# Patient Record
Sex: FEMALE | Race: WHITE | NOT HISPANIC OR LATINO | Employment: OTHER
[De-identification: names, ages, dates, MRNs, and addresses within clinical notes are randomized per-mention and may not be internally consistent; named-entity substitution may affect disease eponyms.]

---

## 2017-02-22 ENCOUNTER — CHARTING TRANS (OUTPATIENT)
Dept: OTHER | Age: 72
End: 2017-02-22

## 2017-04-25 ENCOUNTER — LAB SERVICES (OUTPATIENT)
Dept: OTHER | Age: 72
End: 2017-04-25

## 2017-04-25 LAB
ALBUMIN: 3.8 GM/DL (ref 3.5–5)
ALKALINE PHOSPHATASE: 84 U/L (ref 38–126)
ALT: 25 U/L (ref 9–72)
ANION GAP: 11 MEQ/L (ref 8–16)
AST/SGOT: 22 U/L (ref 14–53)
BASO #: 0.03 K/UL (ref 0–0.2)
BASO %: 0 % (ref 0–2)
BILIRUBIN TOTAL: 0.7 MG/DL (ref 0.2–1.3)
BUN: 23 MG/DL (ref 7–17)
CALCIUM: 9.1 MG/DL (ref 8.4–10.2)
CARBON DIOXIDE: 22 MMOL/L (ref 22–30)
CHLORIDE: 103 MMOL/L (ref 98–107)
CREATININE: 1.28 MG/DL (ref 0.52–1.04)
DIFFERENTIAL COMMENT: NORMAL
EGFR FOR AFRICAN AMERICANS: 49
EGFR FOR NON-AFRICAN AMERICANS: 41
EOS #: 0.12 K/UL (ref 0–0.5)
EOS %: 1 % (ref 0–4)
GLUCOSE: 328 MG/DL (ref 70–100)
HEMATOCRIT: 40.6 % (ref 37–47)
HEMOGLOBIN: 12.9 GM/DL (ref 12–16)
LD: 518 U/L (ref 313–618)
LYMPH #: 10.07 K/UL (ref 1–4.8)
LYMPH %: 67 % (ref 22–44)
MEAN CORPUSCULAR HEMOGLOBIN: 27.6 PG/CELL (ref 27–35)
MEAN CORPUSCULAR HGB CONC: 31.8 G/DL (ref 32–36)
MEAN CORPUSCULAR VOLUME: 86.8 FL (ref 81–103.2)
MEAN PLATELET VOLUME: 13.1 FL (ref 7.4–12)
MONO #: 0.64 K/UL (ref 0–0.8)
MONO %: 4 % (ref 2–10)
NEUTROPHILS #: 4.25 K/UL (ref 1.8–7.7)
NEUTROPHILS %: 28 % (ref 40–70)
PLATELET COUNT: 179 K/UL (ref 145–400)
PLATELET ESTIMATE: ADEQUATE
POTASSIUM: 4.7 MMOL/L (ref 3.6–5)
RBC MORPHOLOGY: NORMAL
RED CELL COUNT: 4.68 M/UL (ref 3.8–5.1)
RED CELL DISTRIBUTION WIDTH: 14.8 % (ref 11.5–14.5)
SODIUM: 136 MMOL/L (ref 137–145)
TOTAL PROTEIN: 6.2 GM/DL (ref 6.3–8.3)
WHITE BLOOD COUNT: 15.1 K/UL (ref 4.8–11.1)

## 2017-06-01 ENCOUNTER — CHARTING TRANS (OUTPATIENT)
Dept: OTHER | Age: 72
End: 2017-06-01

## 2017-06-05 ENCOUNTER — LAB SERVICES (OUTPATIENT)
Dept: OTHER | Age: 72
End: 2017-06-05

## 2017-06-05 LAB
ALBUMIN: 3.6 GM/DL (ref 3.5–5)
ALKALINE PHOSPHATASE: 75 U/L (ref 38–126)
ALT: 32 U/L (ref 9–72)
ANION GAP: 13 MEQ/L (ref 8–16)
AST/SGOT: 23 U/L (ref 14–53)
BASO #: 0.02 K/UL (ref 0–0.2)
BASO %: 0 % (ref 0–2)
BILIRUBIN TOTAL: 0.4 MG/DL (ref 0.2–1.3)
BUN: 33 MG/DL (ref 7–17)
CALCIUM: 8.9 MG/DL (ref 8.4–10.2)
CARBON DIOXIDE: 22 MMOL/L (ref 22–30)
CHLORIDE: 101 MMOL/L (ref 98–107)
CREATININE: 1.37 MG/DL (ref 0.52–1.04)
DIFFERENTIAL COMMENT: NORMAL
EGFR FOR AFRICAN AMERICANS: 46
EGFR FOR NON-AFRICAN AMERICANS: 38
EOS #: 0.03 K/UL (ref 0–0.5)
EOS %: 0 % (ref 0–4)
GLUCOSE: 352 MG/DL (ref 70–100)
HEMATOCRIT: 38.6 % (ref 37–47)
HEMOGLOBIN: 12.4 GM/DL (ref 12–16)
LD: 437 U/L (ref 313–618)
LYMPH #: 7.41 K/UL (ref 1–4.8)
LYMPH %: 62 % (ref 22–44)
MEAN CORPUSCULAR HEMOGLOBIN: 27.8 PG/CELL (ref 27–35)
MEAN CORPUSCULAR HGB CONC: 32.1 G/DL (ref 32–36)
MEAN CORPUSCULAR VOLUME: 86.5 FL (ref 81–103.2)
MEAN PLATELET VOLUME: 13.8 FL (ref 7.4–12)
MONO #: 0.41 K/UL (ref 0–0.8)
MONO %: 3 % (ref 2–10)
NEUTROPHILS #: 4.09 K/UL (ref 1.8–7.7)
NEUTROPHILS %: 34 % (ref 40–70)
PLATELET COUNT: 152 K/UL (ref 145–400)
POTASSIUM: 4.6 MMOL/L (ref 3.6–5)
RED CELL COUNT: 4.46 M/UL (ref 3.8–5.1)
RED CELL DISTRIBUTION WIDTH: 14.6 % (ref 11.5–14.5)
SODIUM: 136 MMOL/L (ref 137–145)
TOTAL PROTEIN: 6 GM/DL (ref 6.3–8.3)
WHITE BLOOD COUNT: 12 K/UL (ref 4.8–11.1)

## 2017-07-03 ENCOUNTER — LAB SERVICES (OUTPATIENT)
Dept: OTHER | Age: 72
End: 2017-07-03

## 2017-07-03 LAB
ALBUMIN: 3.9 GM/DL (ref 3.5–5)
ALKALINE PHOSPHATASE: 91 U/L (ref 38–126)
ALT: 38 U/L (ref 9–72)
ANION GAP: 11 MEQ/L (ref 8–16)
AST/SGOT: 21 U/L (ref 14–53)
BASO #: 0.02 K/UL (ref 0–0.2)
BASO %: 0 % (ref 0–2)
BILIRUBIN TOTAL: 0.5 MG/DL (ref 0.2–1.3)
BUN: 31 MG/DL (ref 7–17)
CALCIUM: 9.4 MG/DL (ref 8.4–10.2)
CARBON DIOXIDE: 27 MMOL/L (ref 22–30)
CHLORIDE: 101 MMOL/L (ref 98–107)
CREATININE: 1.42 MG/DL (ref 0.52–1.04)
DIFFERENTIAL COMMENT: NORMAL
EGFR FOR AFRICAN AMERICANS: 44
EGFR FOR NON-AFRICAN AMERICANS: 36
EOS #: 0.1 K/UL (ref 0–0.5)
EOS %: 1 % (ref 0–4)
GLUCOSE: 397 MG/DL (ref 70–100)
HEMATOCRIT: 42.9 % (ref 37–47)
HEMOGLOBIN: 13.7 GM/DL (ref 12–16)
LD: 424 U/L (ref 313–618)
LYMPH #: 8.37 K/UL (ref 1–4.8)
LYMPH %: 58 % (ref 22–44)
MEAN CORPUSCULAR HEMOGLOBIN: 28.1 PG/CELL (ref 27–35)
MEAN CORPUSCULAR HGB CONC: 31.9 G/DL (ref 32–36)
MEAN CORPUSCULAR VOLUME: 87.9 FL (ref 81–103.2)
MEAN PLATELET VOLUME: 13.5 FL (ref 7.4–12)
MONO #: 0.58 K/UL (ref 0–0.8)
MONO %: 4 % (ref 2–10)
NEUTROPHILS #: 5.31 K/UL (ref 1.8–7.7)
NEUTROPHILS %: 37 % (ref 40–70)
PLATELET COUNT: 170 K/UL (ref 145–400)
POTASSIUM: 4.8 MMOL/L (ref 3.6–5)
RED CELL COUNT: 4.88 M/UL (ref 3.8–5.1)
RED CELL DISTRIBUTION WIDTH: 14.8 % (ref 11.5–14.5)
SODIUM: 139 MMOL/L (ref 137–145)
TOTAL PROTEIN: 6.5 GM/DL (ref 6.3–8.3)
WHITE BLOOD COUNT: 14.4 K/UL (ref 4.8–11.1)

## 2017-07-20 ENCOUNTER — LAB SERVICES (OUTPATIENT)
Dept: OTHER | Age: 72
End: 2017-07-20

## 2017-07-20 LAB
ALBUMIN: 3.7 GM/DL (ref 3.5–5)
ANION GAP: 12 MEQ/L (ref 8–16)
APPEARANCE, URINE: ABNORMAL
BACTERIA, URINE: ABNORMAL
BASO #: 0.02 K/UL (ref 0–0.2)
BASO %: 0 % (ref 0–2)
BILIRUBIN-URINE: ABNORMAL
BUN: 29 MG/DL (ref 7–17)
CALCIUM: 8.4 MG/DL (ref 8.4–10.2)
CARBON DIOXIDE: 24 MMOL/L (ref 22–30)
CHLORIDE: 99 MMOL/L (ref 98–107)
CHOL/HDL RATIO (EUR): 2.8 RATIO (ref 3.7–5.6)
CHOLESTEROL: 108 MG/DL
COLOR, URINE: ABNORMAL
CREATININE RANDOM URINE: 106.4 MG/DL
CREATININE: 1.43 MG/DL (ref 0.52–1.04)
EGFR FOR AFRICAN AMERICANS: 44
EGFR FOR NON-AFRICAN AMERICANS: 36
EOS #: 0.01 K/UL (ref 0–0.5)
EOS %: 0 % (ref 0–4)
GLUCOSE URINE-UA: ABNORMAL MG/DL
GLUCOSE: 348 MG/DL (ref 70–100)
HDL CHOLESTEROL: 39 MG/DL
HEMATOCRIT: 36.7 % (ref 37–47)
HEMOGLOBIN A1C (BHA1C): 10.7 %NGSP
HEMOGLOBIN: 11.9 GM/DL (ref 12–16)
KETONE-URINE: ABNORMAL
LDL CHOLESTEROL (CALCULATED): 34 MG/DL
LYMPH #: 6.09 K/UL (ref 1–4.8)
LYMPH %: 52 % (ref 22–44)
MEAN CORPUSCULAR HEMOGLOBIN: 28.4 PG/CELL (ref 27–35)
MEAN CORPUSCULAR HGB CONC: 32.4 G/DL (ref 32–36)
MEAN CORPUSCULAR VOLUME: 87.6 FL (ref 81–103.2)
MEAN PLATELET VOLUME: 13.6 FL (ref 7.4–12)
MONO #: 0.76 K/UL (ref 0–0.8)
MONO %: 6 % (ref 2–10)
NEUTROPHILS #: 4.92 K/UL (ref 1.8–7.7)
NEUTROPHILS %: 42 % (ref 40–70)
NITRITE-URINE: ABNORMAL
NO CASTS, URINE: ABNORMAL
NO CRYSTALS, URINE: ABNORMAL
OCCULT BLOOD URINE: ABNORMAL
PH-URINE: 5.5
PHOSPHORUS: 2.9 MG/DL (ref 2.5–4.5)
PLATELET COUNT: 147 K/UL (ref 145–400)
POTASSIUM: 4.1 MMOL/L (ref 3.6–5)
PROTEIN-URINE-DIP: ABNORMAL
PROTEIN/CREA RATIO: 0.72 MG/MG
PTH-PARATHYROID HORMONE INTACT: 63.2 PG/ML (ref 14–72.2)
RBC-URINE: ABNORMAL /HPF (ref 0–2)
RED CELL COUNT: 4.19 M/UL (ref 3.8–5.1)
RED CELL DISTRIBUTION WIDTH: 14.2 % (ref 11.5–14.5)
SODIUM: 135 MMOL/L (ref 137–145)
SPECIFIC GRAVITY-URINE: 1.01 (ref 1–1.03)
SQUAMOUS EPITHELIAL CELL URINE: ABNORMAL /LPF
TOTAL PROTEIN RANDOM URINE: 77 MG/DL
TRIGLYCERIDES: 175 MG/DL
URINE LEUKOCYTE ESTERASE: ABNORMAL
UROBILINOGEN-URINE: 1 MG/DL
VLDL (CALC) (EUR): 35 MG/DL (ref 10–55)
WBC-URINE: ABNORMAL /HPF (ref 0–2)
WHITE BLOOD COUNT: 11.8 K/UL (ref 4.8–11.1)

## 2017-08-25 ENCOUNTER — LAB SERVICES (OUTPATIENT)
Dept: OTHER | Age: 72
End: 2017-08-25

## 2017-08-25 LAB
ALBUMIN: 3.6 GM/DL (ref 3.5–5)
ALKALINE PHOSPHATASE: 330 U/L (ref 38–126)
ALT: 342 U/L (ref 9–72)
ANION GAP: 12 MEQ/L (ref 8–16)
AST/SGOT: 129 U/L (ref 14–53)
BASO #: 0.03 K/UL (ref 0–0.2)
BASO %: 0 % (ref 0–2)
BILIRUBIN TOTAL: 1.8 MG/DL (ref 0.2–1.3)
BUN: 26 MG/DL (ref 7–17)
CALCIUM: 8.9 MG/DL (ref 8.4–10.2)
CARBON DIOXIDE: 24 MMOL/L (ref 22–30)
CHLORIDE: 103 MMOL/L (ref 98–107)
CREATININE: 1.41 MG/DL (ref 0.52–1.04)
EGFR FOR AFRICAN AMERICANS: 44
EGFR FOR NON-AFRICAN AMERICANS: 37
EOS #: 0.07 K/UL (ref 0–0.5)
EOS %: 0 % (ref 0–4)
GLUCOSE: 345 MG/DL (ref 70–100)
HEMATOCRIT: 40.6 % (ref 37–47)
HEMOGLOBIN: 13.1 GM/DL (ref 12–16)
LD: 499 U/L (ref 313–618)
LYMPH #: 7.01 K/UL (ref 1–4.8)
LYMPH %: 41 % (ref 22–44)
MEAN CORPUSCULAR HEMOGLOBIN: 28.1 PG/CELL (ref 27–35)
MEAN CORPUSCULAR HGB CONC: 32.3 G/DL (ref 32–36)
MEAN CORPUSCULAR VOLUME: 86.9 FL (ref 81–103.2)
MEAN PLATELET VOLUME: 12.9 FL (ref 7.4–12)
MONO #: 1.06 K/UL (ref 0–0.8)
MONO %: 6 % (ref 2–10)
NEUTROPHILS #: 8.91 K/UL (ref 1.8–7.7)
NEUTROPHILS %: 52 % (ref 40–70)
PLATELET COUNT: 203 K/UL (ref 145–400)
PLATELET ESTIMATE: ADEQUATE
POTASSIUM: 4.2 MMOL/L (ref 3.6–5)
RBC MORPHOLOGY: NORMAL
RED CELL COUNT: 4.67 M/UL (ref 3.8–5.1)
RED CELL DISTRIBUTION WIDTH: 14.9 % (ref 11.5–14.5)
SODIUM: 139 MMOL/L (ref 137–145)
TOTAL PROTEIN: 6.4 GM/DL (ref 6.3–8.3)
WHITE BLOOD COUNT: 17.1 K/UL (ref 4.8–11.1)

## 2017-09-06 ENCOUNTER — CHARTING TRANS (OUTPATIENT)
Dept: OTHER | Age: 72
End: 2017-09-06

## 2017-09-06 LAB
ALBUMIN: 3.9 GM/DL (ref 3.5–5)
ALKALINE PHOSPHATASE: 308 U/L (ref 38–126)
ALT: 106 U/L (ref 9–72)
ANION GAP: 10 MEQ/L (ref 8–16)
AST/SGOT: 60 U/L (ref 14–53)
BASO #: 0.03 K/UL (ref 0–0.2)
BASO %: 0 % (ref 0–2)
BILIRUBIN TOTAL: 0.6 MG/DL (ref 0.2–1.3)
BUN: 29 MG/DL (ref 7–17)
CALCIUM: 9 MG/DL (ref 8.4–10.2)
CARBON DIOXIDE: 23 MMOL/L (ref 22–30)
CHLORIDE: 105 MMOL/L (ref 98–107)
CREATININE: 1.22 MG/DL (ref 0.52–1.04)
EGFR FOR AFRICAN AMERICANS: 52
EGFR FOR NON-AFRICAN AMERICANS: 43
EOS #: 0.12 K/UL (ref 0–0.5)
EOS %: 1 % (ref 0–4)
GLUCOSE: 553 MG/DL (ref 70–100)
HEMATOCRIT: 42.2 % (ref 37–47)
HEMOGLOBIN: 13.5 GM/DL (ref 12–16)
HEPATITIS B CORE ANTIBODY IGM: NEGATIVE
HEPATITIS B SURFACE ANTIGEN: NEGATIVE
HEPATITIS C ANTIBODY: NEGATIVE
LD: 510 U/L (ref 313–618)
LYMPH #: 4.35 K/UL (ref 1–4.8)
LYMPH %: 39 % (ref 22–44)
MEAN CORPUSCULAR HEMOGLOBIN: 28 PG/CELL (ref 27–35)
MEAN CORPUSCULAR HGB CONC: 32 G/DL (ref 32–36)
MEAN CORPUSCULAR VOLUME: 87.6 FL (ref 81–103.2)
MEAN PLATELET VOLUME: 12.6 FL (ref 7.4–12)
MONO #: 0.66 K/UL (ref 0–0.8)
MONO %: 6 % (ref 2–10)
NEUTROPHILS #: 5.98 K/UL (ref 1.8–7.7)
NEUTROPHILS %: 54 % (ref 40–70)
PLATELET COUNT: 220 K/UL (ref 145–400)
POTASSIUM: 5 MMOL/L (ref 3.6–5)
RED CELL COUNT: 4.82 M/UL (ref 3.8–5.1)
RED CELL DISTRIBUTION WIDTH: 14.9 % (ref 11.5–14.5)
SODIUM: 138 MMOL/L (ref 137–145)
TOTAL PROTEIN: 6.4 GM/DL (ref 6.3–8.3)
WHITE BLOOD COUNT: 11.1 K/UL (ref 4.8–11.1)

## 2017-09-07 LAB — HEPATITIS B SURFACE AB QUANT: <3.1 IU/L

## 2017-09-20 ENCOUNTER — LAB SERVICES (OUTPATIENT)
Dept: OTHER | Age: 72
End: 2017-09-20

## 2017-09-20 LAB
ALBUMIN: 3.9 GM/DL (ref 3.5–5)
ALKALINE PHOSPHATASE: 209 U/L (ref 38–126)
ALT: 61 U/L (ref 9–72)
ANION GAP: 11 MEQ/L (ref 8–16)
AST/SGOT: 36 U/L (ref 14–53)
BASO #: 0.02 K/UL (ref 0–0.2)
BASO %: 0 % (ref 0–2)
BILIRUBIN TOTAL: 0.6 MG/DL (ref 0.2–1.3)
BUN: 28 MG/DL (ref 7–17)
CALCIUM: 8.8 MG/DL (ref 8.4–10.2)
CARBON DIOXIDE: 22 MMOL/L (ref 22–30)
CHLORIDE: 109 MMOL/L (ref 98–107)
CREATININE: 1.16 MG/DL (ref 0.52–1.04)
EGFR FOR AFRICAN AMERICANS: 55
EGFR FOR NON-AFRICAN AMERICANS: 46
EOS #: 0.13 K/UL (ref 0–0.5)
EOS %: 1 % (ref 0–4)
GLUCOSE: 248 MG/DL (ref 70–100)
HEMATOCRIT: 43.1 % (ref 37–47)
HEMOGLOBIN: 13.9 GM/DL (ref 12–16)
LD: 480 U/L (ref 313–618)
LYMPH #: 5.39 K/UL (ref 1–4.8)
LYMPH %: 50 % (ref 22–44)
MEAN CORPUSCULAR HEMOGLOBIN: 27.8 PG/CELL (ref 27–35)
MEAN CORPUSCULAR HGB CONC: 32.3 G/DL (ref 32–36)
MEAN CORPUSCULAR VOLUME: 86.2 FL (ref 81–103.2)
MEAN PLATELET VOLUME: 12.8 FL (ref 7.4–12)
MONO #: 0.71 K/UL (ref 0–0.8)
MONO %: 7 % (ref 2–10)
NEUTROPHILS #: 4.54 K/UL (ref 1.8–7.7)
NEUTROPHILS %: 42 % (ref 40–70)
PLATELET COUNT: 173 K/UL (ref 145–400)
POTASSIUM: 4.3 MMOL/L (ref 3.6–5)
RED CELL COUNT: 5 M/UL (ref 3.8–5.1)
RED CELL DISTRIBUTION WIDTH: 14.6 % (ref 11.5–14.5)
SODIUM: 142 MMOL/L (ref 137–145)
TOTAL PROTEIN: 6.3 GM/DL (ref 6.3–8.3)
WHITE BLOOD COUNT: 10.8 K/UL (ref 4.8–11.1)

## 2017-09-21 ENCOUNTER — LAB SERVICES (OUTPATIENT)
Dept: OTHER | Age: 72
End: 2017-09-21

## 2017-09-21 ENCOUNTER — CHARTING TRANS (OUTPATIENT)
Dept: OTHER | Age: 72
End: 2017-09-21

## 2017-09-22 ENCOUNTER — CHARTING TRANS (OUTPATIENT)
Dept: OTHER | Age: 72
End: 2017-09-22

## 2017-09-22 LAB
CHOLEST SERPL-MCNC: 133 MG/DL
CHOLEST/HDLC SERPL: 2.8
HDLC SERPL-MCNC: 47 MG/DL
HEMOGLOBIN A1C - IN OFFICE: 12.1
LDLC SERPL CALC-MCNC: 52 MG/DL
LENGTH OF FAST TIME PATIENT: ABNORMAL HRS
NONHDLC SERPL-MCNC: 86 MG/DL
TRIGL SERPL-MCNC: 172 MG/DL
TSH SERPL-ACNC: 2.02 MCUNITS/ML (ref 0.35–5)

## 2017-10-05 ENCOUNTER — CHARTING TRANS (OUTPATIENT)
Dept: OTHER | Age: 72
End: 2017-10-05

## 2017-10-05 ASSESSMENT — PAIN SCALES - GENERAL: PAINLEVEL_OUTOF10: 0

## 2017-11-20 ENCOUNTER — LAB SERVICES (OUTPATIENT)
Dept: OTHER | Age: 72
End: 2017-11-20

## 2017-11-20 LAB
ALBUMIN: 3.8 GM/DL (ref 3.5–5)
ALKALINE PHOSPHATASE: 126 U/L (ref 38–126)
ALT: 85 U/L (ref 9–72)
ANION GAP: 12 MEQ/L (ref 8–16)
AST/SGOT: 38 U/L (ref 14–53)
BASO #: 0.03 K/UL (ref 0–0.2)
BASO %: 0 % (ref 0–2)
BILIRUBIN TOTAL: 0.5 MG/DL (ref 0.2–1.3)
BUN: 30 MG/DL (ref 7–17)
CALCIUM: 9.2 MG/DL (ref 8.4–10.2)
CARBON DIOXIDE: 26 MMOL/L (ref 22–30)
CHLORIDE: 105 MMOL/L (ref 98–107)
CREATININE: 1.27 MG/DL (ref 0.52–1.04)
DIFFERENTIAL COMMENT: NORMAL
EGFR FOR AFRICAN AMERICANS: 50
EGFR FOR NON-AFRICAN AMERICANS: 41
EOS #: 0.1 K/UL (ref 0–0.5)
EOS %: 1 % (ref 0–4)
GLUCOSE: 112 MG/DL (ref 70–100)
HEMATOCRIT: 41.8 % (ref 37–47)
HEMOGLOBIN: 13.1 GM/DL (ref 12–16)
LYMPH #: 7.59 K/UL (ref 1–4.8)
LYMPH %: 56 % (ref 22–44)
MEAN CORPUSCULAR HEMOGLOBIN: 27.6 PG/CELL (ref 27–35)
MEAN CORPUSCULAR HGB CONC: 31.3 G/DL (ref 32–36)
MEAN CORPUSCULAR VOLUME: 88 FL (ref 81–103.2)
MEAN PLATELET VOLUME: 12.5 FL (ref 7.4–12)
MONO #: 0.59 K/UL (ref 0–0.8)
MONO %: 4 % (ref 2–10)
NEUTROPHILS #: 5.14 K/UL (ref 1.8–7.7)
NEUTROPHILS %: 38 % (ref 40–70)
PLATELET COUNT: 189 K/UL (ref 145–400)
PLATELET ESTIMATE: ADEQUATE
POTASSIUM: 4.3 MMOL/L (ref 3.6–5)
RBC MORPHOLOGY: NORMAL
RED CELL COUNT: 4.75 M/UL (ref 3.8–5.1)
RED CELL DISTRIBUTION WIDTH: 15.2 % (ref 11.5–14.5)
SODIUM: 143 MMOL/L (ref 137–145)
TOTAL PROTEIN: 6.3 GM/DL (ref 6.3–8.3)
WHITE BLOOD COUNT: 13.5 K/UL (ref 4.8–11.1)

## 2017-12-18 ENCOUNTER — LAB SERVICES (OUTPATIENT)
Dept: OTHER | Age: 72
End: 2017-12-18

## 2017-12-18 LAB
ALBUMIN: 4 GM/DL (ref 3.5–5)
ALKALINE PHOSPHATASE: 133 U/L (ref 38–126)
ALT: 80 U/L (ref 9–72)
ANION GAP: 14 MEQ/L (ref 8–16)
AST/SGOT: 46 U/L (ref 14–53)
BASO #: 0.03 K/UL (ref 0–0.2)
BASO %: 0 % (ref 0–2)
BILIRUBIN TOTAL: 0.3 MG/DL (ref 0.2–1.3)
BUN: 44 MG/DL (ref 7–17)
CALCIUM: 10 MG/DL (ref 8.4–10.2)
CARBON DIOXIDE: 23 MMOL/L (ref 22–30)
CHLORIDE: 106 MMOL/L (ref 98–107)
CREATININE: 1.27 MG/DL (ref 0.52–1.04)
DIFFERENTIAL COMMENT: NORMAL
EGFR FOR AFRICAN AMERICANS: 50
EGFR FOR NON-AFRICAN AMERICANS: 41
EOS #: 0.07 K/UL (ref 0–0.5)
EOS %: 1 % (ref 0–4)
GLUCOSE: 273 MG/DL (ref 70–100)
HEMATOCRIT: 44 % (ref 37–47)
HEMOGLOBIN: 13.9 GM/DL (ref 12–16)
LYMPH #: 8.1 K/UL (ref 1–4.8)
LYMPH %: 59 % (ref 22–44)
MEAN CORPUSCULAR HEMOGLOBIN: 27.9 PG/CELL (ref 27–35)
MEAN CORPUSCULAR HGB CONC: 31.6 G/DL (ref 32–36)
MEAN CORPUSCULAR VOLUME: 88.4 FL (ref 81–103.2)
MEAN PLATELET VOLUME: 12.8 FL (ref 7.4–12)
MONO #: 0.57 K/UL (ref 0–0.8)
MONO %: 4 % (ref 2–10)
NEUTROPHILS #: 5.02 K/UL (ref 1.8–7.7)
NEUTROPHILS %: 37 % (ref 40–70)
PLATELET COUNT: 196 K/UL (ref 145–400)
POTASSIUM: 4.8 MMOL/L (ref 3.6–5)
RED CELL COUNT: 4.98 M/UL (ref 3.8–5.1)
RED CELL DISTRIBUTION WIDTH: 14.8 % (ref 11.5–14.5)
SODIUM: 143 MMOL/L (ref 137–145)
TOTAL PROTEIN: 6.6 GM/DL (ref 6.3–8.3)
WHITE BLOOD COUNT: 13.8 K/UL (ref 4.8–11.1)

## 2018-01-17 ENCOUNTER — LAB SERVICES (OUTPATIENT)
Dept: OTHER | Age: 73
End: 2018-01-17

## 2018-01-17 LAB
ALBUMIN: 4 GM/DL (ref 3.5–5)
ALKALINE PHOSPHATASE: 117 U/L (ref 38–126)
ALT: 47 U/L (ref 9–72)
ANION GAP: 11 MEQ/L (ref 8–16)
AST/SGOT: 28 U/L (ref 14–53)
BAND: 1 % (ref 2–6)
BILIRUBIN TOTAL: 0.3 MG/DL (ref 0.2–1.3)
BUN: 49 MG/DL (ref 7–17)
CALCIUM: 9.7 MG/DL (ref 8.4–10.2)
CARBON DIOXIDE: 27 MMOL/L (ref 22–30)
CHLORIDE: 103 MMOL/L (ref 98–107)
CREATININE: 1.4 MG/DL (ref 0.52–1.04)
EGFR FOR AFRICAN AMERICANS: 44
EGFR FOR NON-AFRICAN AMERICANS: 37
EOSINOPHIL ABSOLUTE MANUAL: 0.31 K/UL (ref 0–0.5)
EOSINOPHIL: 2 % (ref 0–4)
GLUCOSE: 225 MG/DL (ref 70–100)
HEMATOCRIT: 42.7 % (ref 37–47)
HEMOGLOBIN: 13.6 GM/DL (ref 12–16)
LYMPHOCYTE: 1 % (ref 22–44)
LYMPHPHOCYTES ABSOLUTE MANUAL: 8.48 K/UL (ref 1–4.8)
MEAN CORPUSCULAR HEMOGLOBIN: 28.1 PG/CELL (ref 27–35)
MEAN CORPUSCULAR HGB CONC: 31.9 G/DL (ref 32–36)
MEAN CORPUSCULAR VOLUME: 88.2 FL (ref 81–103.2)
MEAN PLATELET VOLUME: 12.5 FL (ref 7.4–12)
MONOCYTE: 4 % (ref 2–10)
MONOCYTES ABSOLUTE MANUAL: 0.63 K/UL (ref 0–0.8)
NEUTROPHILS (SEGS): 39 % (ref 40–70)
NEUTROPHILS ABSOLUTE MANUAL: 6.28 K/UL (ref 1.8–7.7)
PLATELET COUNT: 189 K/UL (ref 145–400)
PLATELET ESTIMATE: ADEQUATE
POTASSIUM: 4.3 MMOL/L (ref 3.6–5)
RBC MORPHOLOGY: NORMAL
RED CELL COUNT: 4.84 M/UL (ref 3.8–5.1)
RED CELL DISTRIBUTION WIDTH: 14.4 % (ref 11.5–14.5)
SMUDGE CELLS: 8 /100WBC
SODIUM: 141 MMOL/L (ref 137–145)
TOTAL CELLS COUNTED: 100
TOTAL PROTEIN: 6.8 GM/DL (ref 6.3–8.3)
VARIENT LYMPHOCYTE: 53 % (ref 0–0)
WHITE BLOOD COUNT: 15.7 K/UL (ref 4.8–11.1)

## 2018-02-05 ENCOUNTER — LAB SERVICES (OUTPATIENT)
Dept: OTHER | Age: 73
End: 2018-02-05

## 2018-02-05 ENCOUNTER — CHARTING TRANS (OUTPATIENT)
Dept: OTHER | Age: 73
End: 2018-02-05

## 2018-02-06 ENCOUNTER — CHARTING TRANS (OUTPATIENT)
Dept: OTHER | Age: 73
End: 2018-02-06

## 2018-02-06 LAB
ALBUMIN SERPL-MCNC: 3.9 G/DL (ref 3.6–5.1)
ALBUMIN/GLOB SERPL: 1.3 (ref 1–2.4)
ALP SERPL-CCNC: 144 UNITS/L (ref 45–117)
ALT SERPL-CCNC: 72 UNITS/L
ANION GAP SERPL CALC-SCNC: 15 MMOL/L (ref 10–20)
AST SERPL-CCNC: 40 UNITS/L
BILIRUB SERPL-MCNC: 0.5 MG/DL (ref 0.2–1)
BUN SERPL-MCNC: 46 MG/DL (ref 6–20)
BUN/CREAT SERPL: 32 (ref 7–25)
CALCIUM SERPL-MCNC: 9.4 MG/DL (ref 8.4–10.2)
CHLORIDE SERPL-SCNC: 107 MMOL/L (ref 98–107)
CHOLEST SERPL-MCNC: 192 MG/DL
CHOLEST/HDLC SERPL: 4.2
CO2 SERPL-SCNC: 25 MMOL/L (ref 21–32)
CREAT SERPL-MCNC: 1.45 MG/DL (ref 0.51–0.95)
GLOBULIN SER-MCNC: 2.9 G/DL (ref 2–4)
GLUCOSE SERPL-MCNC: 231 MG/DL (ref 65–99)
HDLC SERPL-MCNC: 46 MG/DL
HEMOGLOBIN A1C - IN OFFICE: 7.9
LDLC SERPL CALC-MCNC: 103 MG/DL
LENGTH OF FAST TIME PATIENT: ABNORMAL HRS
LENGTH OF FAST TIME PATIENT: ABNORMAL HRS
NONHDLC SERPL-MCNC: 146 MG/DL
POTASSIUM SERPL-SCNC: 5 MMOL/L (ref 3.4–5.1)
SODIUM SERPL-SCNC: 142 MMOL/L (ref 135–145)
TOTAL PROTEIN: 6.8 G/DL (ref 6.4–8.2)
TRIGL SERPL-MCNC: 216 MG/DL

## 2018-02-28 ENCOUNTER — CHARTING TRANS (OUTPATIENT)
Dept: OTHER | Age: 73
End: 2018-02-28

## 2018-04-09 ENCOUNTER — LAB SERVICES (OUTPATIENT)
Dept: OTHER | Age: 73
End: 2018-04-09

## 2018-04-09 LAB
ALBUMIN: 4.1 GM/DL (ref 3.5–5)
ALKALINE PHOSPHATASE: 101 U/L (ref 38–126)
ALT: 44 U/L (ref 9–72)
ANION GAP: 11 MEQ/L (ref 8–16)
AST/SGOT: 25 U/L (ref 14–53)
BILIRUBIN TOTAL: 0.3 MG/DL (ref 0.2–1.3)
BUN: 49 MG/DL (ref 7–17)
CALCIUM: 10 MG/DL (ref 8.4–10.2)
CARBON DIOXIDE: 24 MMOL/L (ref 22–30)
CHLORIDE: 107 MMOL/L (ref 98–107)
CREATININE: 1.33 MG/DL (ref 0.52–1.04)
EGFR FOR AFRICAN AMERICANS: 47
EGFR FOR NON-AFRICAN AMERICANS: 39
EOSINOPHIL ABSOLUTE MANUAL: 0.14 K/UL (ref 0–0.5)
EOSINOPHIL: 1 % (ref 0–4)
GLUCOSE: 216 MG/DL (ref 70–100)
HEMATOCRIT: 42.8 % (ref 37–47)
HEMOGLOBIN: 13.7 GM/DL (ref 12–16)
LD: 497 U/L (ref 313–618)
LYMPHOCYTE: 59 % (ref 22–44)
LYMPHPHOCYTES ABSOLUTE MANUAL: 9.15 K/UL (ref 1–4.8)
MEAN CORPUSCULAR HEMOGLOBIN: 28.7 PG/CELL (ref 27–35)
MEAN CORPUSCULAR HGB CONC: 32 G/DL (ref 32–36)
MEAN CORPUSCULAR VOLUME: 89.5 FL (ref 81–102)
MEAN PLATELET VOLUME: 13.1 FL (ref 7.4–12)
MONOCYTE: 4 % (ref 2–10)
MONOCYTES ABSOLUTE MANUAL: 0.57 K/UL (ref 0–0.8)
NEUTROPHILS (SEGS): 31 % (ref 40–70)
NEUTROPHILS ABSOLUTE MANUAL: 4.43 K/UL (ref 1.8–7.7)
PLATELET COUNT: 178 K/UL (ref 145–400)
PLATELET ESTIMATE: ADEQUATE
POTASSIUM: 4.7 MMOL/L (ref 3.6–5)
RBC MORPHOLOGY: NORMAL
RED CELL COUNT: 4.78 M/UL (ref 3.8–5.1)
RED CELL DISTRIBUTION WIDTH: 14.8 % (ref 11.5–14.5)
SMUDGE CELLS: 11 /100WBC
SODIUM: 142 MMOL/L (ref 137–145)
TOTAL CELLS COUNTED: 100
TOTAL PROTEIN: 6.8 GM/DL (ref 6.3–8.3)
VARIENT LYMPHOCYTE: 5 % (ref 0–0)
WHITE BLOOD COUNT: 14.3 K/UL (ref 4.8–11.1)

## 2018-05-29 ENCOUNTER — CHARTING TRANS (OUTPATIENT)
Dept: OTHER | Age: 73
End: 2018-05-29

## 2018-06-11 ENCOUNTER — LAB SERVICES (OUTPATIENT)
Dept: OTHER | Age: 73
End: 2018-06-11

## 2018-06-11 LAB
ALBUMIN: 4 GM/DL (ref 3.5–5)
ALKALINE PHOSPHATASE: 97 U/L (ref 38–126)
ALT: 50 U/L (ref 9–72)
ANION GAP: 11 MEQ/L (ref 8–16)
AST/SGOT: 27 U/L (ref 14–53)
BASO #: 0.03 K/UL (ref 0–0.2)
BASO %: 0 % (ref 0–2)
BILIRUBIN TOTAL: 0.3 MG/DL (ref 0.2–1.3)
BUN: 40 MG/DL (ref 7–17)
CALCIUM: 9.1 MG/DL (ref 8.4–10.2)
CARBON DIOXIDE: 21 MMOL/L (ref 22–30)
CHLORIDE: 109 MMOL/L (ref 98–107)
CREATININE: 1.26 MG/DL (ref 0.52–1.04)
DIFFERENTIAL COMMENT: NORMAL
EGFR FOR AFRICAN AMERICANS: 50
EGFR FOR NON-AFRICAN AMERICANS: 42
EOS #: 0.15 K/UL (ref 0–0.5)
EOS %: 1 % (ref 0–4)
GLUCOSE: 162 MG/DL (ref 70–100)
HEMATOCRIT: 41.7 % (ref 37–47)
HEMOGLOBIN: 14 GM/DL (ref 12–16)
LD: 437 U/L (ref 313–618)
LYMPH #: 7.01 K/UL (ref 1–4.8)
LYMPH %: 47 % (ref 22–44)
MEAN CORPUSCULAR HEMOGLOBIN: 30.3 PG/CELL (ref 27–35)
MEAN CORPUSCULAR HGB CONC: 33.6 G/DL (ref 32–36)
MEAN CORPUSCULAR VOLUME: 90.3 FL (ref 81–102)
MEAN PLATELET VOLUME: 13 FL (ref 7.4–12)
MONO #: 0.94 K/UL (ref 0–0.8)
MONO %: 6 % (ref 2–10)
NEUTROPHILS #: 6.91 K/UL (ref 1.8–7.7)
NEUTROPHILS %: 46 % (ref 40–70)
PLATELET COUNT: 183 K/UL (ref 145–400)
PLATELET ESTIMATE: ADEQUATE
POTASSIUM: 4.3 MMOL/L (ref 3.6–5)
RBC MORPHOLOGY: NORMAL
RED CELL COUNT: 4.62 M/UL (ref 3.8–5.1)
RED CELL DISTRIBUTION WIDTH: 14.4 % (ref 11.5–14.5)
SODIUM: 141 MMOL/L (ref 137–145)
TOTAL PROTEIN: 6.4 GM/DL (ref 6.3–8.3)
WHITE BLOOD COUNT: 15 K/UL (ref 4.8–11.1)

## 2018-07-10 ENCOUNTER — LAB SERVICES (OUTPATIENT)
Dept: OTHER | Age: 73
End: 2018-07-10

## 2018-07-10 LAB
ALBUMIN: 4.2 GM/DL (ref 3.5–5)
ALKALINE PHOSPHATASE: 118 U/L (ref 38–126)
ALT: 49 U/L (ref 9–72)
ANION GAP: 14 MEQ/L (ref 8–16)
AST/SGOT: 19 U/L (ref 14–53)
BASO #: 0.06 K/UL (ref 0–0.2)
BASO %: 0 % (ref 0–2)
BILIRUBIN TOTAL: 0.2 MG/DL (ref 0.2–1.3)
BUN: 46 MG/DL (ref 7–17)
CALCIUM: 10 MG/DL (ref 8.4–10.2)
CARBON DIOXIDE: 17 MMOL/L (ref 22–30)
CHLORIDE: 105 MMOL/L (ref 98–107)
CREATININE: 1.58 MG/DL (ref 0.52–1.04)
DIFFERENTIAL COMMENT: NORMAL
EGFR FOR AFRICAN AMERICANS: 39
EGFR FOR NON-AFRICAN AMERICANS: 32
EOS #: 0.11 K/UL (ref 0–0.5)
EOS %: 1 % (ref 0–4)
GLUCOSE: 512 MG/DL (ref 70–100)
HEMATOCRIT: 42.5 % (ref 37–47)
HEMOGLOBIN: 14.1 GM/DL (ref 12–16)
LYMPH #: 7.62 K/UL (ref 1–4.8)
LYMPH %: 53 % (ref 22–44)
MAGNESIUM: 1.8 MG/DL (ref 1.6–2.3)
MEAN CORPUSCULAR HEMOGLOBIN: 30.5 PG/CELL (ref 27–35)
MEAN CORPUSCULAR HGB CONC: 33.2 G/DL (ref 32–36)
MEAN CORPUSCULAR VOLUME: 92 FL (ref 81–102)
MEAN PLATELET VOLUME: 13.2 FL (ref 7.4–12)
MONO #: 0.75 K/UL (ref 0–0.8)
MONO %: 5 % (ref 2–10)
NEUTROPHILS #: 5.86 K/UL (ref 1.8–7.7)
NEUTROPHILS %: 41 % (ref 40–70)
PLATELET COUNT: 190 K/UL (ref 145–400)
PLATELET ESTIMATE: ADEQUATE
POTASSIUM: 4.8 MMOL/L (ref 3.6–5)
RBC MORPHOLOGY: NORMAL
RED CELL COUNT: 4.62 M/UL (ref 3.8–5.1)
RED CELL DISTRIBUTION WIDTH: 14.3 % (ref 11.5–14.5)
SODIUM: 136 MMOL/L (ref 137–145)
TOTAL PROTEIN: 6.5 GM/DL (ref 6.3–8.3)
WHITE BLOOD COUNT: 14.4 K/UL (ref 4.8–11.1)

## 2018-07-12 ENCOUNTER — LAB SERVICES (OUTPATIENT)
Dept: OTHER | Age: 73
End: 2018-07-12

## 2018-07-15 LAB
CAMPYLOBACTER ANTIGEN: NORMAL
SHIGA TOXIN 1: NEGATIVE
SHIGA TOXIN 2: NEGATIVE
STOOL CULTURE: NORMAL

## 2018-07-17 LAB — TRICHROME: NORMAL

## 2018-09-10 ENCOUNTER — LAB SERVICES (OUTPATIENT)
Dept: OTHER | Age: 73
End: 2018-09-10

## 2018-09-10 LAB
ALBUMIN: 3.9 GM/DL (ref 3.5–5)
ALKALINE PHOSPHATASE: 117 U/L (ref 38–126)
ALT: 43 U/L (ref 9–72)
ANION GAP: 11 MEQ/L (ref 8–16)
AST/SGOT: 29 U/L (ref 14–53)
BASO #: 0.06 K/UL (ref 0–0.2)
BASO %: 0 % (ref 0–2)
BILIRUBIN TOTAL: 0.5 MG/DL (ref 0.2–1.3)
BUN: 40 MG/DL (ref 7–17)
CALCIUM: 8.4 MG/DL (ref 8.4–10.2)
CARBON DIOXIDE: 21 MMOL/L (ref 22–30)
CHLORIDE: 106 MMOL/L (ref 98–107)
CREATININE: 1.32 MG/DL (ref 0.52–1.04)
DIFFERENTIAL COMMENT: NORMAL
EGFR FOR AFRICAN AMERICANS: 48
EGFR FOR NON-AFRICAN AMERICANS: 39
EOS #: 0.09 K/UL (ref 0–0.5)
EOS %: 1 % (ref 0–4)
GLUCOSE: 273 MG/DL (ref 70–100)
HEMATOCRIT: 43.3 % (ref 37–47)
HEMOGLOBIN: 14.3 GM/DL (ref 12–16)
LYMPH #: 8 K/UL (ref 1–4.8)
LYMPH %: 54 % (ref 22–44)
MAGNESIUM: 2 MG/DL (ref 1.6–2.3)
MEAN CORPUSCULAR HEMOGLOBIN: 30.1 PG/CELL (ref 27–35)
MEAN CORPUSCULAR HGB CONC: 33 G/DL (ref 32–36)
MEAN CORPUSCULAR VOLUME: 91.2 FL (ref 81–102)
MEAN PLATELET VOLUME: 13.9 FL (ref 7.4–12)
MONO #: 0.89 K/UL (ref 0–0.8)
MONO %: 6 % (ref 2–10)
NEUTROPHILS #: 5.85 K/UL (ref 1.8–7.7)
NEUTROPHILS %: 39 % (ref 40–70)
PLATELET COUNT: 177 K/UL (ref 145–400)
POTASSIUM: 4.6 MMOL/L (ref 3.6–5)
RED CELL COUNT: 4.75 M/UL (ref 3.8–5.1)
RED CELL DISTRIBUTION WIDTH: 14.6 % (ref 11.5–14.5)
SODIUM: 138 MMOL/L (ref 137–145)
TOTAL PROTEIN: 6.5 GM/DL (ref 6.3–8.3)
WHITE BLOOD COUNT: 14.9 K/UL (ref 4.8–11.1)

## 2018-11-01 VITALS
OXYGEN SATURATION: 95 % | BODY MASS INDEX: 31.54 KG/M2 | WEIGHT: 184.74 LBS | HEART RATE: 54 BPM | SYSTOLIC BLOOD PRESSURE: 178 MMHG | HEIGHT: 64 IN | TEMPERATURE: 98 F | DIASTOLIC BLOOD PRESSURE: 64 MMHG

## 2018-11-01 VITALS
BODY MASS INDEX: 32.44 KG/M2 | TEMPERATURE: 98 F | WEIGHT: 190.04 LBS | HEART RATE: 53 BPM | DIASTOLIC BLOOD PRESSURE: 60 MMHG | RESPIRATION RATE: 16 BRPM | OXYGEN SATURATION: 96 % | SYSTOLIC BLOOD PRESSURE: 150 MMHG | HEIGHT: 64 IN

## 2018-11-01 VITALS
BODY MASS INDEX: 31.77 KG/M2 | OXYGEN SATURATION: 96 % | SYSTOLIC BLOOD PRESSURE: 158 MMHG | HEIGHT: 64 IN | DIASTOLIC BLOOD PRESSURE: 70 MMHG | HEART RATE: 57 BPM | WEIGHT: 186.07 LBS | TEMPERATURE: 98 F | RESPIRATION RATE: 16 BRPM

## 2018-11-02 VITALS
RESPIRATION RATE: 16 BRPM | WEIGHT: 171 LBS | SYSTOLIC BLOOD PRESSURE: 138 MMHG | OXYGEN SATURATION: 97 % | DIASTOLIC BLOOD PRESSURE: 50 MMHG | HEART RATE: 51 BPM

## 2018-11-02 VITALS
DIASTOLIC BLOOD PRESSURE: 78 MMHG | TEMPERATURE: 98.1 F | SYSTOLIC BLOOD PRESSURE: 150 MMHG | OXYGEN SATURATION: 96 % | HEART RATE: 57 BPM | WEIGHT: 170.59 LBS | RESPIRATION RATE: 16 BRPM

## 2018-11-03 VITALS
DIASTOLIC BLOOD PRESSURE: 70 MMHG | SYSTOLIC BLOOD PRESSURE: 140 MMHG | OXYGEN SATURATION: 97 % | BODY MASS INDEX: 29.37 KG/M2 | WEIGHT: 172 LBS | HEART RATE: 60 BPM | RESPIRATION RATE: 18 BRPM | TEMPERATURE: 97.8 F | HEIGHT: 64 IN

## 2018-11-05 VITALS
WEIGHT: 180 LBS | SYSTOLIC BLOOD PRESSURE: 140 MMHG | HEART RATE: 55 BPM | OXYGEN SATURATION: 96 % | HEIGHT: 64 IN | BODY MASS INDEX: 30.73 KG/M2 | DIASTOLIC BLOOD PRESSURE: 60 MMHG

## 2018-12-01 ENCOUNTER — PRIOR ORIGINAL RECORDS (OUTPATIENT)
Dept: HEALTH INFORMATION MANAGEMENT | Facility: OTHER | Age: 73
End: 2018-12-01

## 2018-12-18 ENCOUNTER — TELEPHONE (OUTPATIENT)
Dept: FAMILY MEDICINE | Facility: CLINIC | Age: 73
End: 2018-12-18

## 2018-12-18 RX ORDER — HYDROCODONE BITARTRATE AND ACETAMINOPHEN 5; 325 MG/1; MG/1
TABLET ORAL
Qty: 60 TABLET | Refills: 0 | Status: SHIPPED | OUTPATIENT
Start: 2018-12-18 | End: 2019-01-31 | Stop reason: SDUPTHER

## 2018-12-18 RX ORDER — HYDROCODONE BITARTRATE AND ACETAMINOPHEN 5; 325 MG/1; MG/1
TABLET ORAL
COMMUNITY
Start: 2018-08-09 | End: 2018-12-18 | Stop reason: SDUPTHER

## 2018-12-19 RX ORDER — LOSARTAN POTASSIUM 25 MG/1
TABLET ORAL
Qty: 90 TABLET | Refills: 0 | Status: SHIPPED | OUTPATIENT
Start: 2018-12-19 | End: 2019-01-31 | Stop reason: SDUPTHER

## 2018-12-20 DIAGNOSIS — Z79.4 TYPE 2 DIABETES MELLITUS WITHOUT COMPLICATION, WITH LONG-TERM CURRENT USE OF INSULIN (CMD): Primary | ICD-10-CM

## 2018-12-20 DIAGNOSIS — E11.9 TYPE 2 DIABETES MELLITUS WITHOUT COMPLICATION, WITH LONG-TERM CURRENT USE OF INSULIN (CMD): Primary | ICD-10-CM

## 2018-12-20 RX ORDER — GLIPIZIDE 5 MG/1
1 TABLET ORAL
COMMUNITY
Start: 2018-05-29 | End: 2019-01-31 | Stop reason: SDUPTHER

## 2018-12-20 RX ORDER — INSULIN GLARGINE 100 [IU]/ML
40 INJECTION, SOLUTION SUBCUTANEOUS 2 TIMES DAILY
COMMUNITY
Start: 2018-05-29 | End: 2019-01-31 | Stop reason: SDUPTHER

## 2018-12-20 RX ORDER — METOPROLOL SUCCINATE 50 MG/1
1 TABLET, EXTENDED RELEASE ORAL DAILY
COMMUNITY
Start: 2018-05-29 | End: 2019-01-31 | Stop reason: SDUPTHER

## 2018-12-20 RX ORDER — NIFEDIPINE 30 MG/1
TABLET, FILM COATED, EXTENDED RELEASE ORAL
COMMUNITY
Start: 2018-05-29 | End: 2019-01-31 | Stop reason: SDUPTHER

## 2018-12-20 RX ORDER — HYDROCHLOROTHIAZIDE 25 MG/1
1 TABLET ORAL EVERY MORNING
COMMUNITY
Start: 2018-08-01 | End: 2019-01-31 | Stop reason: SDUPTHER

## 2018-12-20 RX ORDER — IBRUTINIB 140 MG/1
CAPSULE ORAL
COMMUNITY
Start: 2018-05-29 | End: 2019-05-29

## 2019-01-01 ENCOUNTER — EXTERNAL RECORD (OUTPATIENT)
Dept: HEALTH INFORMATION MANAGEMENT | Facility: OTHER | Age: 74
End: 2019-01-01

## 2019-01-08 LAB
ALBUMIN: 4 GM/DL (ref 3.5–5)
ALKALINE PHOSPHATASE: 109 U/L (ref 38–126)
ALT: 80 U/L (ref 4–34)
ANION GAP: 8 MEQ/L (ref 8–16)
AST: 44 U/L (ref 14–53)
BASO #: 0.04 K/UL (ref 0–0.2)
BASO %: 0 % (ref 0–2)
BILIRUBIN TOTAL: 0.5 MG/DL (ref 0.2–1.3)
BUN SERPL-MCNC: 34 MG/DL (ref 7–17)
CALCIUM: 9 MG/DL (ref 8.4–10.2)
CARBON DIOXIDE: 23 MMOL/L (ref 22–30)
CHLORIDE: 106 MMOL/L (ref 98–107)
CREATININE: 1.4 MG/DL (ref 0.52–1.04)
DIFFERENTIAL COMMENT: NORMAL
EGFR FOR AFRICAN AMERICANS: 44
EGFR FOR NON-AFRICAN AMERICANS: 37
EOS #: 0.11 K/UL (ref 0–0.5)
EOS %: 1 % (ref 0–4)
GLUCOSE: 129 MG/DL (ref 70–100)
HEMATOCRIT: 44 % (ref 37–47)
HEMOGLOBIN: 14.1 GM/DL (ref 12–16)
LD: 526 U/L (ref 313–618)
LYMPH #: 6.87 K/UL (ref 1–4.8)
LYMPH %: 51 % (ref 22–44)
MAGNESIUM: 1.7 MG/DL (ref 1.6–2.3)
MEAN CORPUSCULAR HEMOGLOBIN: 29.3 PG/CELL (ref 27–35)
MEAN CORPUSCULAR HGB CONC: 32 G/DL (ref 32–36)
MEAN CORPUSCULAR VOLUME: 91.5 FL (ref 81–102)
MEAN PLATELET VOLUME: 13.3 FL (ref 7.4–12)
MONO #: 0.69 K/UL (ref 0–0.8)
MONO %: 5 % (ref 2–10)
NEUTROPHILS #: 5.74 K/UL (ref 1.8–7.7)
NEUTROPHILS %: 43 % (ref 40–70)
PLATELET COUNT: 169 K/UL (ref 145–400)
PLATELET ESTIMATE: ADEQUATE
POTASSIUM SERPL-SCNC: 4.3 MMOL/L (ref 3.6–5)
RBC MORPHOLOGY: NORMAL
RED CELL COUNT: 4.81 M/UL (ref 3.8–5.1)
RED CELL DISTRIBUTION WIDTH: 13.6 % (ref 11.5–14.5)
SODIUM: 137 MMOL/L (ref 137–145)
TOTAL PROTEIN: 6.4 GM/DL (ref 6.3–8.3)
WHITE BLOOD COUNT: 13.5 K/UL (ref 4.8–11.1)

## 2019-01-31 ENCOUNTER — OFFICE VISIT (OUTPATIENT)
Dept: FAMILY MEDICINE | Age: 74
End: 2019-01-31

## 2019-01-31 DIAGNOSIS — E78.5 HYPERLIPIDEMIA, UNSPECIFIED HYPERLIPIDEMIA TYPE: ICD-10-CM

## 2019-01-31 DIAGNOSIS — M48.061 LUMBAR FORAMINAL STENOSIS: ICD-10-CM

## 2019-01-31 DIAGNOSIS — C91.10 CHRONIC LYMPHOCYTIC LEUKEMIA (CMD): ICD-10-CM

## 2019-01-31 DIAGNOSIS — Z79.4 TYPE 2 DIABETES MELLITUS WITH INSULIN THERAPY (CMD): Primary | ICD-10-CM

## 2019-01-31 DIAGNOSIS — Z78.0 POSTMENOPAUSAL STATUS (AGE-RELATED) (NATURAL): ICD-10-CM

## 2019-01-31 DIAGNOSIS — E11.9 TYPE 2 DIABETES MELLITUS WITH INSULIN THERAPY (CMD): Primary | ICD-10-CM

## 2019-01-31 DIAGNOSIS — Z12.31 VISIT FOR SCREENING MAMMOGRAM: ICD-10-CM

## 2019-01-31 DIAGNOSIS — I10 ESSENTIAL HYPERTENSION: ICD-10-CM

## 2019-01-31 PROBLEM — M79.89 RIGHT LEG SWELLING: Status: ACTIVE | Noted: 2017-02-22

## 2019-01-31 PROBLEM — N18.30 CHRONIC RENAL INSUFFICIENCY, STAGE III (MODERATE) (CMD): Status: ACTIVE | Noted: 2018-01-17

## 2019-01-31 PROBLEM — M20.41 HAMMER TOE OF SECOND TOE OF RIGHT FOOT: Status: ACTIVE | Noted: 2017-09-21

## 2019-01-31 PROCEDURE — 99215 OFFICE O/P EST HI 40 MIN: CPT | Performed by: FAMILY MEDICINE

## 2019-01-31 RX ORDER — HYDROCODONE BITARTRATE AND ACETAMINOPHEN 5; 325 MG/1; MG/1
TABLET ORAL
Qty: 60 TABLET | Refills: 0 | Status: SHIPPED | OUTPATIENT
Start: 2019-01-31 | End: 2019-03-21 | Stop reason: SDUPTHER

## 2019-01-31 RX ORDER — INSULIN GLARGINE 100 [IU]/ML
40 INJECTION, SOLUTION SUBCUTANEOUS 2 TIMES DAILY
Qty: 80 ML | Refills: 1 | Status: SHIPPED | OUTPATIENT
Start: 2019-01-31 | End: 2019-03-05 | Stop reason: SDUPTHER

## 2019-01-31 RX ORDER — NIFEDIPINE 30 MG/1
30 TABLET, FILM COATED, EXTENDED RELEASE ORAL 2 TIMES DAILY
Qty: 180 TABLET | Refills: 1 | Status: SHIPPED | OUTPATIENT
Start: 2019-01-31 | End: 2019-04-04 | Stop reason: ALTCHOICE

## 2019-01-31 RX ORDER — LOSARTAN POTASSIUM 25 MG/1
25 TABLET ORAL DAILY
Qty: 90 TABLET | Refills: 1 | Status: SHIPPED | OUTPATIENT
Start: 2019-01-31 | End: 2019-03-05 | Stop reason: SDUPTHER

## 2019-01-31 RX ORDER — GLIPIZIDE 5 MG/1
5 TABLET ORAL
Qty: 90 TABLET | Refills: 1 | Status: SHIPPED | OUTPATIENT
Start: 2019-01-31 | End: 2019-06-06 | Stop reason: SDUPTHER

## 2019-01-31 RX ORDER — HYDROCHLOROTHIAZIDE 25 MG/1
25 TABLET ORAL EVERY MORNING
Qty: 90 TABLET | Refills: 1 | Status: SHIPPED | OUTPATIENT
Start: 2019-01-31 | End: 2019-04-04 | Stop reason: ALTCHOICE

## 2019-01-31 RX ORDER — METOPROLOL SUCCINATE 50 MG/1
50 TABLET, EXTENDED RELEASE ORAL DAILY
Qty: 90 TABLET | Refills: 1 | Status: SHIPPED | OUTPATIENT
Start: 2019-01-31 | End: 2019-05-02 | Stop reason: SDUPTHER

## 2019-01-31 RX ORDER — ATORVASTATIN CALCIUM 10 MG/1
10 TABLET, FILM COATED ORAL AT BEDTIME
Qty: 90 TABLET | Refills: 1 | Status: SHIPPED | OUTPATIENT
Start: 2019-01-31 | End: 2019-07-27 | Stop reason: SDUPTHER

## 2019-01-31 SDOH — HEALTH STABILITY: MENTAL HEALTH: HOW OFTEN DO YOU HAVE A DRINK CONTAINING ALCOHOL?: NEVER

## 2019-01-31 ASSESSMENT — ENCOUNTER SYMPTOMS
HEADACHES: 0
CHILLS: 0
CHEST TIGHTNESS: 0
NAUSEA: 0
BACK PAIN: 1
DIARRHEA: 0
VOMITING: 0
EYE REDNESS: 0
NERVOUS/ANXIOUS: 0
FEVER: 0
NUMBNESS: 0
SHORTNESS OF BREATH: 0
ABDOMINAL PAIN: 0
BLOOD IN STOOL: 0
LIGHT-HEADEDNESS: 0
CONFUSION: 0

## 2019-01-31 ASSESSMENT — PAIN SCALES - GENERAL: PAINLEVEL: 9-10

## 2019-02-08 RX ORDER — BENZONATATE 200 MG/1
CAPSULE ORAL
COMMUNITY
End: 2019-03-21 | Stop reason: ALTCHOICE

## 2019-02-19 ENCOUNTER — APPOINTMENT (OUTPATIENT)
Dept: FAMILY MEDICINE | Age: 74
End: 2019-02-19

## 2019-02-19 LAB
GLUCOSE METER BEDSIDE: 191 MG/DL (ref 70–99)
GLUCOSE METER BEDSIDE: 199 MG/DL (ref 70–99)

## 2019-02-27 ENCOUNTER — TELEPHONE (OUTPATIENT)
Dept: FAMILY MEDICINE | Age: 74
End: 2019-02-27

## 2019-02-28 DIAGNOSIS — Z78.0 POSTMENOPAUSAL STATUS (AGE-RELATED) (NATURAL): ICD-10-CM

## 2019-02-28 DIAGNOSIS — Z12.31 VISIT FOR SCREENING MAMMOGRAM: ICD-10-CM

## 2019-03-05 ENCOUNTER — OFFICE VISIT (OUTPATIENT)
Dept: FAMILY MEDICINE | Age: 74
End: 2019-03-05

## 2019-03-05 DIAGNOSIS — I10 ESSENTIAL HYPERTENSION: ICD-10-CM

## 2019-03-05 DIAGNOSIS — E11.9 TYPE 2 DIABETES MELLITUS WITH INSULIN THERAPY (CMD): ICD-10-CM

## 2019-03-05 DIAGNOSIS — Z79.4 TYPE 2 DIABETES MELLITUS WITH INSULIN THERAPY (CMD): ICD-10-CM

## 2019-03-05 DIAGNOSIS — K05.00 GINGIVITIS, ACUTE: Primary | ICD-10-CM

## 2019-03-05 DIAGNOSIS — F51.01 PRIMARY INSOMNIA: ICD-10-CM

## 2019-03-05 DIAGNOSIS — E55.9 VITAMIN D INSUFFICIENCY: Primary | ICD-10-CM

## 2019-03-05 DIAGNOSIS — M81.0 AGE-RELATED OSTEOPOROSIS WITHOUT CURRENT PATHOLOGICAL FRACTURE: ICD-10-CM

## 2019-03-05 LAB
25 OH VITAMIN D TOTAL: 21 NG/ML (ref 30–100)
CHOL/HDL RATIO (EUR): 3.8 RATIO (ref 3.7–5.6)
CHOLESTEROL: 182 MG/DL
GLUCOSE: 221 MG/DL (ref 70–100)
HDL CHOLESTEROL: 48 MG/DL
HEMOGLOBIN A1C (HA1C): 9.7 %NGSP
LDL CHOLESTEROL (CALCULATED): 94 MG/DL
TRIGLYCERIDES: 201 MG/DL
VLDL (CALC) (EUR): 40 MG/DL (ref 10–55)

## 2019-03-05 PROCEDURE — 99214 OFFICE O/P EST MOD 30 MIN: CPT | Performed by: FAMILY MEDICINE

## 2019-03-05 RX ORDER — ALENDRONATE SODIUM 70 MG/1
70 TABLET ORAL
Qty: 12 TABLET | Refills: 3 | Status: SHIPPED | OUTPATIENT
Start: 2019-03-05 | End: 2020-02-06 | Stop reason: SDUPTHER

## 2019-03-05 RX ORDER — TRAZODONE HYDROCHLORIDE 50 MG/1
25 TABLET ORAL NIGHTLY
Qty: 30 TABLET | Refills: 5 | Status: SHIPPED | OUTPATIENT
Start: 2019-03-05 | End: 2020-01-20 | Stop reason: ALTCHOICE

## 2019-03-05 RX ORDER — AMOXICILLIN 875 MG/1
875 TABLET, COATED ORAL 2 TIMES DAILY
Qty: 20 TABLET | Refills: 0 | Status: SHIPPED | OUTPATIENT
Start: 2019-03-05 | End: 2019-04-04 | Stop reason: ALTCHOICE

## 2019-03-05 RX ORDER — INSULIN GLARGINE 100 [IU]/ML
44 INJECTION, SOLUTION SUBCUTANEOUS 2 TIMES DAILY
Qty: 90 ML | Refills: 11 | Status: SHIPPED | OUTPATIENT
Start: 2019-03-05 | End: 2019-04-04 | Stop reason: SDUPTHER

## 2019-03-05 RX ORDER — LOSARTAN POTASSIUM 25 MG/1
100 TABLET ORAL DAILY
Qty: 90 TABLET | Refills: 3 | Status: SHIPPED | OUTPATIENT
Start: 2019-03-05 | End: 2019-03-21 | Stop reason: SDUPTHER

## 2019-03-05 ASSESSMENT — PATIENT HEALTH QUESTIONNAIRE - PHQ9
SUM OF ALL RESPONSES TO PHQ9 QUESTIONS 1 AND 2: 0
1. LITTLE INTEREST OR PLEASURE IN DOING THINGS: NOT AT ALL
2. FEELING DOWN, DEPRESSED OR HOPELESS: NOT AT ALL
SUM OF ALL RESPONSES TO PHQ9 QUESTIONS 1 AND 2: 0

## 2019-03-05 ASSESSMENT — ENCOUNTER SYMPTOMS
CHEST TIGHTNESS: 0
SHORTNESS OF BREATH: 0
NUMBNESS: 0
EYE PAIN: 0
NAUSEA: 0
DIZZINESS: 0
VOMITING: 0
LIGHT-HEADEDNESS: 0
WEAKNESS: 0
ABDOMINAL PAIN: 0
DIARRHEA: 0
FATIGUE: 0
HEADACHES: 0
UNEXPECTED WEIGHT CHANGE: 0

## 2019-03-05 ASSESSMENT — PAIN SCALES - GENERAL: PAINLEVEL: 0

## 2019-03-06 PROBLEM — E55.9 VITAMIN D INSUFFICIENCY: Status: ACTIVE | Noted: 2019-03-06

## 2019-03-21 ENCOUNTER — OFFICE VISIT (OUTPATIENT)
Dept: FAMILY MEDICINE | Age: 74
End: 2019-03-21

## 2019-03-21 VITALS
RESPIRATION RATE: 22 BRPM | WEIGHT: 198 LBS | TEMPERATURE: 98 F | DIASTOLIC BLOOD PRESSURE: 64 MMHG | HEIGHT: 64 IN | OXYGEN SATURATION: 96 % | HEART RATE: 50 BPM | BODY MASS INDEX: 33.8 KG/M2 | SYSTOLIC BLOOD PRESSURE: 162 MMHG

## 2019-03-21 DIAGNOSIS — I10 ESSENTIAL HYPERTENSION: Primary | ICD-10-CM

## 2019-03-21 DIAGNOSIS — M48.061 LUMBAR FORAMINAL STENOSIS: ICD-10-CM

## 2019-03-21 DIAGNOSIS — R60.0 EDEMA OF BOTH LEGS: ICD-10-CM

## 2019-03-21 LAB
ANION GAP: 12 MEQ/L (ref 8–16)
BUN SERPL-MCNC: 35 MG/DL (ref 7–17)
CALCIUM: 8.6 MG/DL (ref 8.4–10.2)
CARBON DIOXIDE: 20 MMOL/L (ref 22–30)
CHLORIDE: 108 MMOL/L (ref 98–107)
CREATININE: 1.24 MG/DL (ref 0.52–1.04)
EGFR FOR AFRICAN AMERICANS: 51
EGFR FOR NON-AFRICAN AMERICANS: 42
GLUCOSE: 205 MG/DL (ref 70–100)
POTASSIUM SERPL-SCNC: 4.6 MMOL/L (ref 3.6–5)
SODIUM: 140 MMOL/L (ref 137–145)

## 2019-03-21 PROCEDURE — 99214 OFFICE O/P EST MOD 30 MIN: CPT | Performed by: FAMILY MEDICINE

## 2019-03-21 PROCEDURE — 36415 COLL VENOUS BLD VENIPUNCTURE: CPT | Performed by: FAMILY MEDICINE

## 2019-03-21 RX ORDER — HYDROCODONE BITARTRATE AND ACETAMINOPHEN 5; 325 MG/1; MG/1
TABLET ORAL
Qty: 60 TABLET | Refills: 0 | Status: SHIPPED | OUTPATIENT
Start: 2019-03-21 | End: 2019-05-16 | Stop reason: SDUPTHER

## 2019-03-21 RX ORDER — LOSARTAN POTASSIUM 100 MG/1
100 TABLET ORAL DAILY
Qty: 30 TABLET | Refills: 11 | Status: SHIPPED | OUTPATIENT
Start: 2019-03-21 | End: 2019-05-02 | Stop reason: SDUPTHER

## 2019-03-21 RX ORDER — SPIRONOLACTONE 25 MG/1
25 TABLET ORAL DAILY
Qty: 30 TABLET | Refills: 11 | Status: SHIPPED | OUTPATIENT
Start: 2019-03-21 | End: 2019-04-04 | Stop reason: ALTCHOICE

## 2019-03-25 LAB — FLEXI RESULT: NORMAL

## 2019-04-02 ENCOUNTER — TELEPHONE (OUTPATIENT)
Dept: FAMILY MEDICINE | Age: 74
End: 2019-04-02

## 2019-04-02 LAB
ANION GAP: 9 MEQ/L (ref 8–16)
BUN SERPL-MCNC: 37 MG/DL (ref 7–17)
CALCIUM: 9.3 MG/DL (ref 8.4–10.2)
CARBON DIOXIDE: 27 MMOL/L (ref 22–30)
CHLORIDE: 104 MMOL/L (ref 98–107)
CREATININE: 1.54 MG/DL (ref 0.52–1.04)
EGFR FOR AFRICAN AMERICANS: 40
EGFR FOR NON-AFRICAN AMERICANS: 33
GLUCOSE: 111 MG/DL (ref 70–100)
POTASSIUM SERPL-SCNC: 4.7 MMOL/L (ref 3.6–5)
SODIUM: 140 MMOL/L (ref 137–145)

## 2019-04-04 ENCOUNTER — OFFICE VISIT (OUTPATIENT)
Dept: FAMILY MEDICINE | Age: 74
End: 2019-04-04

## 2019-04-04 VITALS
BODY MASS INDEX: 33.64 KG/M2 | HEART RATE: 57 BPM | WEIGHT: 196 LBS | RESPIRATION RATE: 18 BRPM | TEMPERATURE: 97.9 F | SYSTOLIC BLOOD PRESSURE: 152 MMHG | OXYGEN SATURATION: 94 % | DIASTOLIC BLOOD PRESSURE: 56 MMHG

## 2019-04-04 DIAGNOSIS — Z79.4 TYPE 2 DIABETES MELLITUS WITH INSULIN THERAPY (CMD): ICD-10-CM

## 2019-04-04 DIAGNOSIS — R60.0 EDEMA OF BOTH LEGS: ICD-10-CM

## 2019-04-04 DIAGNOSIS — Z12.11 SCREEN FOR COLON CANCER: ICD-10-CM

## 2019-04-04 DIAGNOSIS — E11.9 TYPE 2 DIABETES MELLITUS WITH INSULIN THERAPY (CMD): ICD-10-CM

## 2019-04-04 DIAGNOSIS — I10 ESSENTIAL HYPERTENSION: Primary | ICD-10-CM

## 2019-04-04 PROCEDURE — 99214 OFFICE O/P EST MOD 30 MIN: CPT | Performed by: FAMILY MEDICINE

## 2019-04-04 RX ORDER — AMLODIPINE BESYLATE 10 MG/1
10 TABLET ORAL DAILY
Qty: 90 TABLET | Refills: 3 | Status: SHIPPED | OUTPATIENT
Start: 2019-04-04 | End: 2020-03-26

## 2019-04-04 RX ORDER — TRIAMTERENE AND HYDROCHLOROTHIAZIDE 37.5; 25 MG/1; MG/1
1 CAPSULE ORAL DAILY
Qty: 90 CAPSULE | Refills: 3 | Status: SHIPPED | OUTPATIENT
Start: 2019-04-04 | End: 2019-05-28 | Stop reason: ALTCHOICE

## 2019-04-04 RX ORDER — INSULIN GLARGINE 100 [IU]/ML
48 INJECTION, SOLUTION SUBCUTANEOUS 2 TIMES DAILY
Qty: 90 ML | Refills: 11 | Status: SHIPPED | OUTPATIENT
Start: 2019-04-04 | End: 2019-05-28 | Stop reason: SDUPTHER

## 2019-04-04 ASSESSMENT — ENCOUNTER SYMPTOMS
FATIGUE: 0
HEADACHES: 0
DIZZINESS: 0
SHORTNESS OF BREATH: 1
LIGHT-HEADEDNESS: 0
UNEXPECTED WEIGHT CHANGE: 0
CHEST TIGHTNESS: 0
EYE PAIN: 0
WEAKNESS: 0

## 2019-04-08 LAB
ALBUMIN: 4 GM/DL (ref 3.5–5)
ALKALINE PHOSPHATASE: 88 U/L (ref 38–126)
ALT: 21 U/L (ref 4–34)
ANION GAP: 10 MEQ/L (ref 8–16)
AST: 15 U/L (ref 14–53)
BASO #: 0.04 K/UL (ref 0–0.2)
BASO %: 0 % (ref 0–2)
BILIRUBIN TOTAL: 0.6 MG/DL (ref 0.2–1.3)
BUN SERPL-MCNC: 51 MG/DL (ref 7–17)
CALCIUM: 9.4 MG/DL (ref 8.4–10.2)
CARBON DIOXIDE: 17 MMOL/L (ref 22–30)
CHLORIDE: 110 MMOL/L (ref 98–107)
CREATININE: 2 MG/DL (ref 0.52–1.04)
DIFFERENTIAL COMMENT: NORMAL
EGFR FOR AFRICAN AMERICANS: 29
EGFR FOR NON-AFRICAN AMERICANS: 24
EOS #: 0.16 K/UL (ref 0–0.5)
EOS %: 2 % (ref 0–4)
GLUCOSE: 255 MG/DL (ref 70–100)
HEMATOCRIT: 41.8 % (ref 37–47)
HEMOGLOBIN: 13.7 GM/DL (ref 12–16)
LYMPH #: 4.7 K/UL (ref 1–4.8)
LYMPH %: 48 % (ref 22–44)
MAGNESIUM: 1.6 MG/DL (ref 1.6–2.3)
MEAN CORPUSCULAR HEMOGLOBIN: 29.3 PG/CELL (ref 27–35)
MEAN CORPUSCULAR HGB CONC: 32.8 G/DL (ref 32–36)
MEAN CORPUSCULAR VOLUME: 89.3 FL (ref 81–102)
MEAN PLATELET VOLUME: 12.5 FL (ref 7.4–12)
MONO #: 0.63 K/UL (ref 0–0.8)
MONO %: 7 % (ref 2–10)
NEUTROPHILS #: 4.23 K/UL (ref 1.8–7.7)
NEUTROPHILS %: 43 % (ref 40–70)
PLATELET COUNT: 191 K/UL (ref 145–400)
POTASSIUM SERPL-SCNC: 5.6 MMOL/L (ref 3.6–5)
RED CELL COUNT: 4.68 M/UL (ref 3.8–5.1)
RED CELL DISTRIBUTION WIDTH: 14.1 % (ref 11.5–14.5)
SODIUM: 137 MMOL/L (ref 137–145)
THYROID STIMULATING HORMONE: 3.72 MIU/ML (ref 0.47–4.68)
TOTAL PROTEIN: 6.7 GM/DL (ref 6.3–8.3)
WHITE BLOOD COUNT: 9.8 K/UL (ref 4.8–11.1)

## 2019-04-09 DIAGNOSIS — I10 ESSENTIAL HYPERTENSION: ICD-10-CM

## 2019-04-11 ENCOUNTER — NURSE ONLY (OUTPATIENT)
Dept: FAMILY MEDICINE | Age: 74
End: 2019-04-11

## 2019-04-11 VITALS — DIASTOLIC BLOOD PRESSURE: 52 MMHG | SYSTOLIC BLOOD PRESSURE: 156 MMHG | HEART RATE: 42 BPM

## 2019-04-11 DIAGNOSIS — I10 ESSENTIAL HYPERTENSION: ICD-10-CM

## 2019-04-11 PROCEDURE — X1094 NO CHARGE VISIT: HCPCS | Performed by: FAMILY MEDICINE

## 2019-04-29 ENCOUNTER — TELEPHONE (OUTPATIENT)
Dept: FAMILY MEDICINE | Age: 74
End: 2019-04-29

## 2019-04-29 LAB
25 OH VITAMIN D TOTAL: 21.4 NG/ML (ref 30–100)
ANION GAP: 9 MEQ/L (ref 8–16)
BUN SERPL-MCNC: 27 MG/DL (ref 7–17)
CALCIUM: 7.9 MG/DL (ref 8.4–10.2)
CARBON DIOXIDE: 25 MMOL/L (ref 22–30)
CHLORIDE: 100 MMOL/L (ref 98–107)
CREATININE: 1.63 MG/DL (ref 0.52–1.04)
EGFR FOR AFRICAN AMERICANS: 37
EGFR FOR NON-AFRICAN AMERICANS: 31
GLUCOSE: 252 MG/DL (ref 70–100)
POTASSIUM SERPL-SCNC: 4.7 MMOL/L (ref 3.6–5)
SODIUM: 134 MMOL/L (ref 137–145)

## 2019-05-02 ENCOUNTER — OFFICE VISIT (OUTPATIENT)
Dept: FAMILY MEDICINE | Age: 74
End: 2019-05-02

## 2019-05-02 VITALS
BODY MASS INDEX: 32.44 KG/M2 | TEMPERATURE: 98.8 F | RESPIRATION RATE: 24 BRPM | OXYGEN SATURATION: 94 % | DIASTOLIC BLOOD PRESSURE: 60 MMHG | HEART RATE: 57 BPM | SYSTOLIC BLOOD PRESSURE: 150 MMHG | WEIGHT: 189 LBS

## 2019-05-02 DIAGNOSIS — R60.0 EDEMA OF BOTH LEGS: ICD-10-CM

## 2019-05-02 DIAGNOSIS — I10 ESSENTIAL HYPERTENSION: ICD-10-CM

## 2019-05-02 DIAGNOSIS — J01.40 ACUTE NON-RECURRENT PANSINUSITIS: Primary | ICD-10-CM

## 2019-05-02 PROCEDURE — 99214 OFFICE O/P EST MOD 30 MIN: CPT | Performed by: FAMILY MEDICINE

## 2019-05-02 RX ORDER — AZITHROMYCIN 250 MG/1
TABLET, FILM COATED ORAL
Qty: 6 TABLET | Refills: 0 | Status: SHIPPED | OUTPATIENT
Start: 2019-05-02 | End: 2019-05-07

## 2019-05-02 RX ORDER — METOPROLOL SUCCINATE 100 MG/1
100 TABLET, EXTENDED RELEASE ORAL DAILY
Qty: 30 TABLET | Refills: 11 | Status: SHIPPED | OUTPATIENT
Start: 2019-05-02 | End: 2019-05-28 | Stop reason: SDUPTHER

## 2019-05-02 RX ORDER — LOSARTAN POTASSIUM 100 MG/1
100 TABLET ORAL AT BEDTIME
Qty: 30 TABLET | Refills: 11 | Status: SHIPPED | OUTPATIENT
Start: 2019-05-02 | End: 2020-06-18

## 2019-05-02 RX ORDER — BENZONATATE 100 MG/1
100-200 CAPSULE ORAL 3 TIMES DAILY PRN
Qty: 60 CAPSULE | Refills: 1 | Status: SHIPPED | OUTPATIENT
Start: 2019-05-02 | End: 2019-05-28 | Stop reason: ALTCHOICE

## 2019-05-02 ASSESSMENT — ENCOUNTER SYMPTOMS
CHILLS: 0
EYE REDNESS: 0
DIARRHEA: 0
SORE THROAT: 0
VOICE CHANGE: 0
COUGH: 1
NUMBNESS: 0
RHINORRHEA: 1
SINUS PRESSURE: 1
HEADACHES: 0
WHEEZING: 0
VOMITING: 0
EYE PAIN: 0
SHORTNESS OF BREATH: 1
EYE DISCHARGE: 0
ABDOMINAL PAIN: 0
CHEST TIGHTNESS: 0
EYE ITCHING: 0
FEVER: 0
SINUS PAIN: 1
WEAKNESS: 0
NAUSEA: 0

## 2019-05-16 ENCOUNTER — OFFICE VISIT (OUTPATIENT)
Dept: FAMILY MEDICINE | Age: 74
End: 2019-05-16

## 2019-05-16 ENCOUNTER — APPOINTMENT (OUTPATIENT)
Dept: FAMILY MEDICINE | Age: 74
End: 2019-05-16

## 2019-05-16 VITALS
SYSTOLIC BLOOD PRESSURE: 146 MMHG | BODY MASS INDEX: 32.96 KG/M2 | DIASTOLIC BLOOD PRESSURE: 58 MMHG | TEMPERATURE: 97.7 F | HEART RATE: 56 BPM | WEIGHT: 192 LBS

## 2019-05-16 DIAGNOSIS — N18.30 CKD (CHRONIC KIDNEY DISEASE), STAGE III (CMD): ICD-10-CM

## 2019-05-16 DIAGNOSIS — J01.40 ACUTE PANSINUSITIS, RECURRENCE NOT SPECIFIED: ICD-10-CM

## 2019-05-16 DIAGNOSIS — A40.3 SEPSIS DUE TO STREPTOCOCCUS PNEUMONIAE (CMD): Primary | ICD-10-CM

## 2019-05-16 DIAGNOSIS — R60.0 EDEMA OF BOTH LEGS: ICD-10-CM

## 2019-05-16 DIAGNOSIS — R74.8 ELEVATED LIVER ENZYMES: ICD-10-CM

## 2019-05-16 DIAGNOSIS — M48.061 LUMBAR FORAMINAL STENOSIS: ICD-10-CM

## 2019-05-16 PROCEDURE — 99214 OFFICE O/P EST MOD 30 MIN: CPT | Performed by: FAMILY MEDICINE

## 2019-05-16 RX ORDER — NEOMYCIN/POLYMYXIN B/HYDROCORT 3.5-10K-1
1 SUSPENSION, DROPS(FINAL DOSAGE FORM)(ML) OPHTHALMIC (EYE) EVERY 6 HOURS SCHEDULED
COMMUNITY
End: 2019-07-25 | Stop reason: ALTCHOICE

## 2019-05-16 RX ORDER — HYDROCODONE BITARTRATE AND ACETAMINOPHEN 5; 325 MG/1; MG/1
TABLET ORAL
Qty: 60 TABLET | Refills: 0 | Status: SHIPPED | OUTPATIENT
Start: 2019-05-16 | End: 2019-07-25 | Stop reason: SDUPTHER

## 2019-05-16 ASSESSMENT — ENCOUNTER SYMPTOMS
EYE ITCHING: 0
WEAKNESS: 0
VOICE CHANGE: 0
SORE THROAT: 0
DIARRHEA: 0
ABDOMINAL PAIN: 0
SINUS PAIN: 1
NAUSEA: 0
RHINORRHEA: 0
CHILLS: 0
VOMITING: 0
FEVER: 0
EYE DISCHARGE: 0
SINUS PRESSURE: 1
COUGH: 0
HEADACHES: 0
WHEEZING: 0
CHEST TIGHTNESS: 0
EYE REDNESS: 0
EYE PAIN: 0
SHORTNESS OF BREATH: 0

## 2019-05-20 ENCOUNTER — OFFICE VISIT (OUTPATIENT)
Dept: FAMILY MEDICINE | Age: 74
End: 2019-05-20

## 2019-05-20 DIAGNOSIS — J01.40 ACUTE PANSINUSITIS, RECURRENCE NOT SPECIFIED: ICD-10-CM

## 2019-05-20 DIAGNOSIS — L03.211 CELLULITIS OF FACE: ICD-10-CM

## 2019-05-20 DIAGNOSIS — E87.5 HYPERKALEMIA: Primary | ICD-10-CM

## 2019-05-20 DIAGNOSIS — R74.8 ELEVATED LIVER ENZYMES: ICD-10-CM

## 2019-05-20 DIAGNOSIS — A40.3 SEPSIS DUE TO STREPTOCOCCUS PNEUMONIAE (CMD): ICD-10-CM

## 2019-05-20 LAB
ALBUMIN: 3.7 GM/DL (ref 3.5–5)
ALKALINE PHOSPHATASE: 83 U/L (ref 38–126)
ALT: 27 U/L (ref 4–34)
ANION GAP: 8 MEQ/L (ref 8–16)
AST: 33 U/L (ref 14–53)
BILIRUBIN TOTAL: 0.5 MG/DL (ref 0.2–1.3)
BUN SERPL-MCNC: 34 MG/DL (ref 7–17)
CALCIUM: 8.6 MG/DL (ref 8.4–10.2)
CARBON DIOXIDE: 21 MMOL/L (ref 22–30)
CHLORIDE: 105 MMOL/L (ref 98–107)
CREATININE: 1.7 MG/DL (ref 0.52–1.04)
EGFR FOR AFRICAN AMERICANS: 35
EGFR FOR NON-AFRICAN AMERICANS: 29
GLUCOSE: 282 MG/DL (ref 70–100)
POTASSIUM SERPL-SCNC: 6.5 MMOL/L (ref 3.6–5)
SODIUM: 134 MMOL/L (ref 137–145)
TOTAL PROTEIN: 6.2 GM/DL (ref 6.3–8.3)

## 2019-05-20 PROCEDURE — 99214 OFFICE O/P EST MOD 30 MIN: CPT | Performed by: FAMILY MEDICINE

## 2019-05-20 PROCEDURE — 93010 ELECTROCARDIOGRAM REPORT: CPT | Performed by: INTERNAL MEDICINE

## 2019-05-20 ASSESSMENT — ENCOUNTER SYMPTOMS
CHILLS: 0
NAUSEA: 0
WHEEZING: 0
SHORTNESS OF BREATH: 1
HEADACHES: 0
CHEST TIGHTNESS: 0
EYE PAIN: 0
WEAKNESS: 1
RHINORRHEA: 0
FATIGUE: 0
UNEXPECTED WEIGHT CHANGE: 0
ABDOMINAL PAIN: 0
DIZZINESS: 0
DIARRHEA: 0
VOMITING: 0
LIGHT-HEADEDNESS: 0

## 2019-05-20 ASSESSMENT — PAIN SCALES - GENERAL: PAINLEVEL: 0

## 2019-05-28 ENCOUNTER — OFFICE VISIT (OUTPATIENT)
Dept: FAMILY MEDICINE | Age: 74
End: 2019-05-28

## 2019-05-28 DIAGNOSIS — E11.9 TYPE 2 DIABETES MELLITUS WITH INSULIN THERAPY (CMD): ICD-10-CM

## 2019-05-28 DIAGNOSIS — Z79.4 TYPE 2 DIABETES MELLITUS WITH INSULIN THERAPY (CMD): ICD-10-CM

## 2019-05-28 DIAGNOSIS — J01.40 ACUTE PANSINUSITIS, RECURRENCE NOT SPECIFIED: Primary | ICD-10-CM

## 2019-05-28 DIAGNOSIS — I10 ESSENTIAL HYPERTENSION: ICD-10-CM

## 2019-05-28 DIAGNOSIS — J01.40 ACUTE NON-RECURRENT PANSINUSITIS: ICD-10-CM

## 2019-05-28 DIAGNOSIS — R60.0 EDEMA OF BOTH LEGS: ICD-10-CM

## 2019-05-28 DIAGNOSIS — E87.5 HYPERKALEMIA: ICD-10-CM

## 2019-05-28 PROBLEM — L03.211 CELLULITIS OF FACE: Status: RESOLVED | Noted: 2019-05-20 | Resolved: 2019-05-28

## 2019-05-28 LAB
ANION GAP: 12 MEQ/L (ref 8–16)
BUN SERPL-MCNC: 37 MG/DL (ref 7–17)
CALCIUM: 8.7 MG/DL (ref 8.4–10.2)
CARBON DIOXIDE: 20 MMOL/L (ref 22–30)
CHLORIDE: 106 MMOL/L (ref 98–107)
CREATININE: 1.36 MG/DL (ref 0.52–1.04)
EGFR FOR AFRICAN AMERICANS: 46
EGFR FOR NON-AFRICAN AMERICANS: 38
GLUCOSE: 206 MG/DL (ref 70–100)
POTASSIUM SERPL-SCNC: 4.8 MMOL/L (ref 3.6–5)
SODIUM: 138 MMOL/L (ref 137–145)

## 2019-05-28 PROCEDURE — 99214 OFFICE O/P EST MOD 30 MIN: CPT | Performed by: FAMILY MEDICINE

## 2019-05-28 RX ORDER — LIDOCAINE 50 MG/G
1 PATCH TOPICAL EVERY 24 HOURS
COMMUNITY

## 2019-05-28 RX ORDER — INSULIN GLARGINE 100 [IU]/ML
35 INJECTION, SOLUTION SUBCUTANEOUS 2 TIMES DAILY
Qty: 90 ML | Refills: 11 | Status: SHIPPED | OUTPATIENT
Start: 2019-05-28 | End: 2020-04-29

## 2019-05-28 RX ORDER — METOPROLOL SUCCINATE 25 MG/1
50 TABLET, EXTENDED RELEASE ORAL DAILY
Qty: 1 TABLET | Refills: 0
Start: 2019-05-28 | End: 2019-08-15 | Stop reason: SDUPTHER

## 2019-05-28 RX ORDER — HYDROCHLOROTHIAZIDE 25 MG/1
25 TABLET ORAL DAILY
COMMUNITY
End: 2019-06-14 | Stop reason: SDUPTHER

## 2019-05-28 ASSESSMENT — ENCOUNTER SYMPTOMS
WHEEZING: 0
SINUS PAIN: 0
ABDOMINAL PAIN: 0
VOMITING: 0
FEVER: 0
HEADACHES: 0
CHEST TIGHTNESS: 0
VOICE CHANGE: 0
EYE ITCHING: 0
SHORTNESS OF BREATH: 0
COUGH: 0
LIGHT-HEADEDNESS: 0
FATIGUE: 0
DIARRHEA: 0
EYE PAIN: 0
EYE DISCHARGE: 0
CHILLS: 0
NAUSEA: 0
UNEXPECTED WEIGHT CHANGE: 0
RHINORRHEA: 0
SORE THROAT: 0
DIZZINESS: 0
SINUS PRESSURE: 0
EYE REDNESS: 0

## 2019-05-28 ASSESSMENT — PAIN SCALES - GENERAL: PAINLEVEL: 0

## 2019-05-28 ASSESSMENT — PATIENT HEALTH QUESTIONNAIRE - PHQ9
2. FEELING DOWN, DEPRESSED OR HOPELESS: NOT AT ALL
1. LITTLE INTEREST OR PLEASURE IN DOING THINGS: NOT AT ALL
SUM OF ALL RESPONSES TO PHQ9 QUESTIONS 1 AND 2: 0
SUM OF ALL RESPONSES TO PHQ9 QUESTIONS 1 AND 2: 0

## 2019-06-03 ENCOUNTER — APPOINTMENT (OUTPATIENT)
Dept: FAMILY MEDICINE | Age: 74
End: 2019-06-03

## 2019-06-06 ENCOUNTER — OFFICE VISIT (OUTPATIENT)
Dept: FAMILY MEDICINE | Age: 74
End: 2019-06-06

## 2019-06-06 VITALS
HEIGHT: 64 IN | BODY MASS INDEX: 33.12 KG/M2 | DIASTOLIC BLOOD PRESSURE: 56 MMHG | SYSTOLIC BLOOD PRESSURE: 170 MMHG | TEMPERATURE: 97.8 F | RESPIRATION RATE: 20 BRPM | HEART RATE: 60 BPM | OXYGEN SATURATION: 96 % | WEIGHT: 194 LBS

## 2019-06-06 DIAGNOSIS — I10 ESSENTIAL HYPERTENSION: ICD-10-CM

## 2019-06-06 DIAGNOSIS — Z79.4 TYPE 2 DIABETES MELLITUS WITH INSULIN THERAPY (CMD): Primary | ICD-10-CM

## 2019-06-06 DIAGNOSIS — R60.0 EDEMA OF BOTH LEGS: ICD-10-CM

## 2019-06-06 DIAGNOSIS — E11.9 TYPE 2 DIABETES MELLITUS WITH INSULIN THERAPY (CMD): Primary | ICD-10-CM

## 2019-06-06 PROCEDURE — 99214 OFFICE O/P EST MOD 30 MIN: CPT | Performed by: FAMILY MEDICINE

## 2019-06-06 RX ORDER — HYDRALAZINE HYDROCHLORIDE 10 MG/1
10 TABLET, FILM COATED ORAL 3 TIMES DAILY
Qty: 90 TABLET | Refills: 11 | Status: SHIPPED | OUTPATIENT
Start: 2019-06-06 | End: 2019-07-25 | Stop reason: SDUPTHER

## 2019-06-06 RX ORDER — FUROSEMIDE 20 MG/1
20 TABLET ORAL DAILY
Qty: 30 TABLET | Refills: 11 | Status: SHIPPED | OUTPATIENT
Start: 2019-06-06 | End: 2019-06-25 | Stop reason: DRUGHIGH

## 2019-06-06 RX ORDER — GLIPIZIDE 5 MG/1
5 TABLET ORAL
Qty: 90 TABLET | Refills: 1 | Status: SHIPPED | OUTPATIENT
Start: 2019-06-06 | End: 2020-03-19

## 2019-06-06 ASSESSMENT — ENCOUNTER SYMPTOMS
ABDOMINAL PAIN: 0
SHORTNESS OF BREATH: 0
DIARRHEA: 0
FATIGUE: 0
NAUSEA: 0
CHEST TIGHTNESS: 0
HEADACHES: 0
VOMITING: 0
EYE PAIN: 0
DIZZINESS: 0
UNEXPECTED WEIGHT CHANGE: 0
LIGHT-HEADEDNESS: 0

## 2019-06-07 ENCOUNTER — APPOINTMENT (OUTPATIENT)
Dept: FAMILY MEDICINE | Age: 74
End: 2019-06-07

## 2019-06-13 ENCOUNTER — TELEPHONE (OUTPATIENT)
Dept: FAMILY MEDICINE | Age: 74
End: 2019-06-13

## 2019-06-13 DIAGNOSIS — J01.40 ACUTE PANSINUSITIS, RECURRENCE NOT SPECIFIED: Primary | ICD-10-CM

## 2019-06-14 ENCOUNTER — TELEPHONE (OUTPATIENT)
Dept: FAMILY MEDICINE | Age: 74
End: 2019-06-14

## 2019-06-14 DIAGNOSIS — I10 ESSENTIAL HYPERTENSION: Primary | ICD-10-CM

## 2019-06-14 RX ORDER — HYDROCHLOROTHIAZIDE 25 MG/1
25 TABLET ORAL DAILY
Qty: 30 TABLET | Refills: 5 | Status: SHIPPED | OUTPATIENT
Start: 2019-06-14 | End: 2019-07-25 | Stop reason: ALTCHOICE

## 2019-06-18 DIAGNOSIS — J01.40 ACUTE PANSINUSITIS, RECURRENCE NOT SPECIFIED: ICD-10-CM

## 2019-06-21 LAB
ANION GAP: 12 MEQ/L (ref 8–16)
BUN SERPL-MCNC: 54 MG/DL (ref 7–17)
CALCIUM: 8.8 MG/DL (ref 8.4–10.2)
CARBON DIOXIDE: 21 MMOL/L (ref 22–30)
CHLORIDE: 106 MMOL/L (ref 98–107)
CREATININE: 1.62 MG/DL (ref 0.52–1.04)
EGFR FOR AFRICAN AMERICANS: 37
EGFR FOR NON-AFRICAN AMERICANS: 31
GLUCOSE: 155 MG/DL (ref 70–100)
MAGNESIUM: 1.8 MG/DL (ref 1.6–2.3)
POTASSIUM SERPL-SCNC: 4.5 MMOL/L (ref 3.6–5)
SODIUM: 139 MMOL/L (ref 137–145)

## 2019-06-25 ENCOUNTER — OFFICE VISIT (OUTPATIENT)
Dept: FAMILY MEDICINE | Age: 74
End: 2019-06-25

## 2019-06-25 DIAGNOSIS — R60.0 EDEMA OF BOTH LEGS: Primary | ICD-10-CM

## 2019-06-25 DIAGNOSIS — R42 ORTHOSTATIC LIGHTHEADEDNESS: ICD-10-CM

## 2019-06-25 DIAGNOSIS — Z79.4 TYPE 2 DIABETES MELLITUS WITH INSULIN THERAPY (CMD): ICD-10-CM

## 2019-06-25 DIAGNOSIS — E11.9 TYPE 2 DIABETES MELLITUS WITH INSULIN THERAPY (CMD): ICD-10-CM

## 2019-06-25 DIAGNOSIS — I1A.0 RESISTANT HYPERTENSION: ICD-10-CM

## 2019-06-25 PROBLEM — J01.40 ACUTE PANSINUSITIS: Status: RESOLVED | Noted: 2019-05-28 | Resolved: 2019-06-25

## 2019-06-25 PROCEDURE — 99214 OFFICE O/P EST MOD 30 MIN: CPT | Performed by: FAMILY MEDICINE

## 2019-06-25 RX ORDER — FUROSEMIDE 20 MG/1
20 TABLET ORAL
Qty: 15 TABLET | Refills: 11 | Status: SHIPPED | OUTPATIENT
Start: 2019-06-27 | End: 2020-05-21 | Stop reason: SDUPTHER

## 2019-06-25 ASSESSMENT — ENCOUNTER SYMPTOMS
EYE PAIN: 0
DIZZINESS: 0
NAUSEA: 0
SHORTNESS OF BREATH: 0
HEADACHES: 0
UNEXPECTED WEIGHT CHANGE: 0
ABDOMINAL PAIN: 0
VOMITING: 0
FATIGUE: 0
DIARRHEA: 0
CHEST TIGHTNESS: 0
LIGHT-HEADEDNESS: 1

## 2019-06-25 ASSESSMENT — PATIENT HEALTH QUESTIONNAIRE - PHQ9
2. FEELING DOWN, DEPRESSED OR HOPELESS: NOT AT ALL
SUM OF ALL RESPONSES TO PHQ9 QUESTIONS 1 AND 2: 0
SUM OF ALL RESPONSES TO PHQ9 QUESTIONS 1 AND 2: 0
1. LITTLE INTEREST OR PLEASURE IN DOING THINGS: NOT AT ALL

## 2019-06-25 ASSESSMENT — PAIN SCALES - GENERAL: PAINLEVEL: 0

## 2019-07-16 LAB
ALBUMIN: 4.2 GM/DL (ref 3.5–5)
ALKALINE PHOSPHATASE: 102 U/L (ref 38–126)
ALT: 31 U/L (ref 4–34)
ANION GAP: 15 MEQ/L (ref 8–16)
AST: 25 U/L (ref 14–53)
BASO #: 0.03 K/UL (ref 0–0.2)
BASO %: 0 % (ref 0–2)
BILIRUBIN TOTAL: 0.4 MG/DL (ref 0.2–1.3)
BUN SERPL-MCNC: 38 MG/DL (ref 7–17)
CALCIUM: 8.8 MG/DL (ref 8.4–10.2)
CARBON DIOXIDE: 20 MMOL/L (ref 22–30)
CHLORIDE: 104 MMOL/L (ref 98–107)
CREATININE: 1.52 MG/DL (ref 0.52–1.04)
DIFFERENTIAL COMMENT: NORMAL
EGFR FOR AFRICAN AMERICANS: 40
EGFR FOR NON-AFRICAN AMERICANS: 33
EOS #: 0.16 K/UL (ref 0–0.5)
EOS %: 1 % (ref 0–4)
GLUCOSE: 274 MG/DL (ref 70–100)
HEMATOCRIT: 41.1 % (ref 37–47)
HEMOGLOBIN: 13.6 GM/DL (ref 12–16)
LD: 197 U/L (ref 120–246)
LYMPH #: 5.37 K/UL (ref 1–4.8)
LYMPH %: 43 % (ref 22–44)
MEAN CORPUSCULAR HEMOGLOBIN: 28.6 PG/CELL (ref 27–35)
MEAN CORPUSCULAR HGB CONC: 33.1 G/DL (ref 32–36)
MEAN CORPUSCULAR VOLUME: 86.5 FL (ref 81–102)
MEAN PLATELET VOLUME: 12.9 FL (ref 7.4–12)
MONO #: 0.81 K/UL (ref 0–0.8)
MONO %: 7 % (ref 2–10)
NEUTROPHILS #: 6.02 K/UL (ref 1.8–7.7)
NEUTROPHILS %: 49 % (ref 40–70)
PLATELET COUNT: 167 K/UL (ref 145–400)
PLATELET ESTIMATE: ADEQUATE
POTASSIUM SERPL-SCNC: 4.8 MMOL/L (ref 3.6–5)
RBC MORPHOLOGY: NORMAL
RED CELL COUNT: 4.75 M/UL (ref 3.8–5.1)
RED CELL DISTRIBUTION WIDTH: 14.9 % (ref 11.5–14.5)
SODIUM: 139 MMOL/L (ref 137–145)
TOTAL PROTEIN: 6.8 GM/DL (ref 6.3–8.3)
WHITE BLOOD COUNT: 12.4 K/UL (ref 4.8–11.1)

## 2019-07-23 LAB
ANION GAP: 13 MEQ/L (ref 8–16)
BUN SERPL-MCNC: 40 MG/DL (ref 7–17)
CALCIUM: 9.3 MG/DL (ref 8.4–10.2)
CARBON DIOXIDE: 22 MMOL/L (ref 22–30)
CHLORIDE: 103 MMOL/L (ref 98–107)
CREATININE: 1.71 MG/DL (ref 0.52–1.04)
EGFR FOR AFRICAN AMERICANS: 35
EGFR FOR NON-AFRICAN AMERICANS: 29
GLUCOSE: 165 MG/DL (ref 70–100)
HEMOGLOBIN A1C (HA1C): 8.6 %NGSP
POTASSIUM SERPL-SCNC: 5.2 MMOL/L (ref 3.6–5)
SODIUM: 138 MMOL/L (ref 137–145)

## 2019-07-25 ENCOUNTER — OFFICE VISIT (OUTPATIENT)
Dept: FAMILY MEDICINE | Age: 74
End: 2019-07-25

## 2019-07-25 VITALS
TEMPERATURE: 97.8 F | SYSTOLIC BLOOD PRESSURE: 188 MMHG | BODY MASS INDEX: 32.61 KG/M2 | HEIGHT: 64 IN | OXYGEN SATURATION: 94 % | DIASTOLIC BLOOD PRESSURE: 52 MMHG | WEIGHT: 191 LBS | RESPIRATION RATE: 30 BRPM | HEART RATE: 58 BPM

## 2019-07-25 DIAGNOSIS — I1A.0 RESISTANT HYPERTENSION: Primary | ICD-10-CM

## 2019-07-25 DIAGNOSIS — Z79.4 TYPE 2 DIABETES MELLITUS WITH INSULIN THERAPY (CMD): ICD-10-CM

## 2019-07-25 DIAGNOSIS — R60.0 EDEMA OF BOTH LEGS: ICD-10-CM

## 2019-07-25 DIAGNOSIS — E11.9 TYPE 2 DIABETES MELLITUS WITH INSULIN THERAPY (CMD): ICD-10-CM

## 2019-07-25 DIAGNOSIS — Z79.4 TYPE 2 DIABETES MELLITUS WITHOUT COMPLICATION, WITH LONG-TERM CURRENT USE OF INSULIN (CMD): ICD-10-CM

## 2019-07-25 DIAGNOSIS — M48.061 LUMBAR FORAMINAL STENOSIS: ICD-10-CM

## 2019-07-25 DIAGNOSIS — E11.9 TYPE 2 DIABETES MELLITUS WITHOUT COMPLICATION, WITH LONG-TERM CURRENT USE OF INSULIN (CMD): ICD-10-CM

## 2019-07-25 DIAGNOSIS — I10 ESSENTIAL HYPERTENSION: ICD-10-CM

## 2019-07-25 DIAGNOSIS — K52.9 CHRONIC DIARRHEA: ICD-10-CM

## 2019-07-25 PROCEDURE — 99214 OFFICE O/P EST MOD 30 MIN: CPT | Performed by: FAMILY MEDICINE

## 2019-07-25 RX ORDER — CHLORTHALIDONE 50 MG/1
50 TABLET ORAL DAILY
Qty: 30 TABLET | Refills: 11 | Status: SHIPPED | OUTPATIENT
Start: 2019-07-25 | End: 2020-01-20 | Stop reason: ALTCHOICE

## 2019-07-25 RX ORDER — HYDRALAZINE HYDROCHLORIDE 10 MG/1
TABLET, FILM COATED ORAL
Qty: 20 TABLET | Refills: 0
Start: 2019-07-25 | End: 2019-08-08

## 2019-07-25 RX ORDER — HYDROCODONE BITARTRATE AND ACETAMINOPHEN 5; 325 MG/1; MG/1
TABLET ORAL
Qty: 60 TABLET | Refills: 0 | Status: SHIPPED | OUTPATIENT
Start: 2019-07-25 | End: 2019-09-20 | Stop reason: SDUPTHER

## 2019-07-25 RX ORDER — SPIRONOLACTONE 25 MG/1
25 TABLET ORAL DAILY
Qty: 30 TABLET | Refills: 11 | Status: SHIPPED | OUTPATIENT
Start: 2019-07-25 | End: 2019-08-28 | Stop reason: SINTOL

## 2019-07-25 ASSESSMENT — ENCOUNTER SYMPTOMS
DIARRHEA: 1
UNEXPECTED WEIGHT CHANGE: 0
FATIGUE: 0
ABDOMINAL PAIN: 0
CONSTIPATION: 0
HEADACHES: 0
LIGHT-HEADEDNESS: 0
SHORTNESS OF BREATH: 0
VOMITING: 0
ANAL BLEEDING: 0
DIZZINESS: 0
NAUSEA: 0
CHEST TIGHTNESS: 0
BLOOD IN STOOL: 0
EYE PAIN: 0

## 2019-07-25 ASSESSMENT — PATIENT HEALTH QUESTIONNAIRE - PHQ9
1. LITTLE INTEREST OR PLEASURE IN DOING THINGS: NOT AT ALL
SUM OF ALL RESPONSES TO PHQ9 QUESTIONS 1 AND 2: 0
2. FEELING DOWN, DEPRESSED OR HOPELESS: NOT AT ALL
SUM OF ALL RESPONSES TO PHQ9 QUESTIONS 1 AND 2: 0

## 2019-07-25 ASSESSMENT — COGNITIVE AND FUNCTIONAL STATUS - GENERAL
DO YOU HAVE SERIOUS DIFFICULTY WALKING OR CLIMBING STAIRS: NO
DO YOU HAVE DIFFICULTY DRESSING OR BATHING: NO
BECAUSE OF A PHYSICAL, MENTAL, OR EMOTIONAL CONDITION, DO YOU HAVE SERIOUS DIFFICULTY CONCENTRATING, REMEMBERING OR MAKING DECISIONS: NO
BECAUSE OF A PHYSICAL, MENTAL, OR EMOTIONAL CONDITION, DO YOU HAVE DIFFICULTY DOING ERRANDS ALONE: NO

## 2019-07-27 DIAGNOSIS — E78.5 HYPERLIPIDEMIA, UNSPECIFIED HYPERLIPIDEMIA TYPE: ICD-10-CM

## 2019-07-27 DIAGNOSIS — Z79.4 TYPE 2 DIABETES MELLITUS WITHOUT COMPLICATION, WITH LONG-TERM CURRENT USE OF INSULIN (CMD): ICD-10-CM

## 2019-07-27 DIAGNOSIS — E11.9 TYPE 2 DIABETES MELLITUS WITHOUT COMPLICATION, WITH LONG-TERM CURRENT USE OF INSULIN (CMD): ICD-10-CM

## 2019-07-27 RX ORDER — ATORVASTATIN CALCIUM 10 MG/1
TABLET, FILM COATED ORAL
Qty: 90 TABLET | Refills: 1 | Status: SHIPPED | OUTPATIENT
Start: 2019-07-27 | End: 2019-09-20 | Stop reason: SDUPTHER

## 2019-07-29 RX ORDER — CALCIUM CITRATE/VITAMIN D3 200MG-6.25
TABLET ORAL
Qty: 100 STRIP | Refills: 2 | Status: SHIPPED | OUTPATIENT
Start: 2019-07-29

## 2019-08-03 LAB
5-HIAA MG/24HR: 0.9 MG/24 H
Lab: 28 MCG/24 H (ref 52–480)
Lab: 28 MCG/24 H (ref 52–480)
Lab: 6 MCG/24 H (ref 15–100)
Lab: 6 MCG/24 H (ref 15–100)
Lab: 6 MCG/24 H (ref 26–121)
Lab: 6 MCG/24 H (ref 26–121)
Lab: <1 MCG/24 H (ref 2–24)
Lab: <1 MCG/24 H (ref 2–24)
TOTAL VOLUME: 600 ML
TOTAL VOLUME: 600 ML

## 2019-08-15 ENCOUNTER — OFFICE VISIT (OUTPATIENT)
Dept: FAMILY MEDICINE | Age: 74
End: 2019-08-15

## 2019-08-15 ENCOUNTER — NURSE ONLY (OUTPATIENT)
Dept: FAMILY MEDICINE | Age: 74
End: 2019-08-15

## 2019-08-15 VITALS — SYSTOLIC BLOOD PRESSURE: 146 MMHG | HEART RATE: 45 BPM | DIASTOLIC BLOOD PRESSURE: 58 MMHG

## 2019-08-15 VITALS — DIASTOLIC BLOOD PRESSURE: 58 MMHG | HEART RATE: 45 BPM | SYSTOLIC BLOOD PRESSURE: 146 MMHG

## 2019-08-15 DIAGNOSIS — I10 ESSENTIAL HYPERTENSION: ICD-10-CM

## 2019-08-15 DIAGNOSIS — R00.1 BRADYCARDIA: Primary | ICD-10-CM

## 2019-08-15 DIAGNOSIS — I1A.0 RESISTANT HYPERTENSION: ICD-10-CM

## 2019-08-15 PROCEDURE — 93000 ELECTROCARDIOGRAM COMPLETE: CPT | Performed by: FAMILY MEDICINE

## 2019-08-15 PROCEDURE — 99214 OFFICE O/P EST MOD 30 MIN: CPT | Performed by: FAMILY MEDICINE

## 2019-08-15 RX ORDER — METOPROLOL SUCCINATE 25 MG/1
25 TABLET, EXTENDED RELEASE ORAL DAILY
Qty: 30 TABLET | Refills: 2 | Status: SHIPPED | OUTPATIENT
Start: 2019-08-15 | End: 2019-11-13 | Stop reason: SDUPTHER

## 2019-08-15 ASSESSMENT — ENCOUNTER SYMPTOMS
SPEECH DIFFICULTY: 0
NAUSEA: 0
WEAKNESS: 0
CONFUSION: 0
FATIGUE: 1
LIGHT-HEADEDNESS: 1
FEVER: 0
WHEEZING: 0
SHORTNESS OF BREATH: 0
ACTIVITY CHANGE: 0
HEADACHES: 0
COUGH: 0
VOMITING: 0
CHEST TIGHTNESS: 0
CHILLS: 0
DIZZINESS: 0

## 2019-08-27 DIAGNOSIS — E87.5 HYPERKALEMIA: Primary | ICD-10-CM

## 2019-08-27 LAB
ANION GAP: 9 MEQ/L (ref 8–16)
BUN SERPL-MCNC: 50 MG/DL (ref 7–17)
CALCIUM: 9.6 MG/DL (ref 8.4–10.2)
CARBON DIOXIDE: 20 MMOL/L (ref 22–30)
CHLORIDE: 108 MMOL/L (ref 98–107)
CREATININE: 1.73 MG/DL (ref 0.52–1.04)
EGFR FOR AFRICAN AMERICANS: 35
EGFR FOR NON-AFRICAN AMERICANS: 29
GLUCOSE: 331 MG/DL (ref 70–100)
POTASSIUM SERPL-SCNC: 6 MMOL/L (ref 3.6–5)
SODIUM: 137 MMOL/L (ref 137–145)

## 2019-08-28 RX ORDER — ASPIRIN 81 MG/1
TABLET ORAL
COMMUNITY
Start: 2016-10-24

## 2019-09-03 ENCOUNTER — TELEPHONE (OUTPATIENT)
Dept: FAMILY MEDICINE | Age: 74
End: 2019-09-03

## 2019-09-03 ENCOUNTER — APPOINTMENT (OUTPATIENT)
Dept: FAMILY MEDICINE | Age: 74
End: 2019-09-03

## 2019-09-03 LAB
ANION GAP: 9 MEQ/L (ref 8–16)
BUN SERPL-MCNC: 46 MG/DL (ref 7–17)
CALCIUM: 8.5 MG/DL (ref 8.4–10.2)
CARBON DIOXIDE: 19 MMOL/L (ref 22–30)
CHLORIDE: 109 MMOL/L (ref 98–107)
CREATININE RANDOM URINE: 68.3 MG/DL
CREATININE: 1.63 MG/DL (ref 0.52–1.04)
EGFR FOR AFRICAN AMERICANS: 37
EGFR FOR NON-AFRICAN AMERICANS: 31
GLUCOSE: 452 MG/DL (ref 70–100)
MICROALB/CREA RATIO: 110 MCG/MG
MICROALBUMIN UR: 7.5 MG/DL
POTASSIUM SERPL-SCNC: 4.6 MMOL/L (ref 3.6–5)
SODIUM: 137 MMOL/L (ref 137–145)

## 2019-09-05 ENCOUNTER — APPOINTMENT (OUTPATIENT)
Dept: FAMILY MEDICINE | Age: 74
End: 2019-09-05

## 2019-09-13 DIAGNOSIS — I10 ESSENTIAL HYPERTENSION: ICD-10-CM

## 2019-09-13 RX ORDER — METOPROLOL SUCCINATE 50 MG/1
TABLET, EXTENDED RELEASE ORAL
Qty: 90 TABLET | Refills: 1 | OUTPATIENT
Start: 2019-09-13

## 2019-09-19 LAB
GLUCOSE METER BEDSIDE: 133 MG/DL (ref 70–99)
GLUCOSE METER BEDSIDE: 141 MG/DL (ref 70–99)

## 2019-09-20 DIAGNOSIS — E78.5 HYPERLIPIDEMIA, UNSPECIFIED HYPERLIPIDEMIA TYPE: ICD-10-CM

## 2019-09-20 DIAGNOSIS — M48.061 LUMBAR FORAMINAL STENOSIS: ICD-10-CM

## 2019-09-20 PROCEDURE — 99284 EMERGENCY DEPT VISIT MOD MDM: CPT | Performed by: PHYSICIAN ASSISTANT

## 2019-09-20 RX ORDER — NALOXONE HYDROCHLORIDE 4 MG/.1ML
SPRAY NASAL
Qty: 2 EACH | Refills: 1 | Status: SHIPPED | OUTPATIENT
Start: 2019-09-20

## 2019-09-20 RX ORDER — ATORVASTATIN CALCIUM 10 MG/1
TABLET, FILM COATED ORAL
Qty: 90 TABLET | Refills: 1 | Status: SHIPPED | OUTPATIENT
Start: 2019-09-20 | End: 2020-03-19

## 2019-09-20 RX ORDER — HYDROCODONE BITARTRATE AND ACETAMINOPHEN 5; 325 MG/1; MG/1
TABLET ORAL
Qty: 60 TABLET | Refills: 0 | Status: SHIPPED | OUTPATIENT
Start: 2019-09-20 | End: 2020-01-20 | Stop reason: SDUPTHER

## 2019-09-24 DIAGNOSIS — Z12.11 SCREEN FOR COLON CANCER: ICD-10-CM

## 2019-09-27 ENCOUNTER — TELEPHONE (OUTPATIENT)
Dept: FAMILY MEDICINE | Age: 74
End: 2019-09-27

## 2019-09-27 DIAGNOSIS — C91.10 CHRONIC LYMPHOID LEUKEMIA, WITHOUT MENTION OF HAVING ACHIEVED REMISSION(204.10) (CMD): Primary | ICD-10-CM

## 2019-10-08 LAB
ALBUMIN: 3.7 GM/DL (ref 3.5–5)
ALKALINE PHOSPHATASE: 90 U/L (ref 38–126)
ALT: 32 U/L (ref 4–34)
ANION GAP: 8 MEQ/L (ref 8–16)
AST: 24 U/L (ref 14–53)
BAND: 2 % (ref 2–6)
BASOPHIL ABSOLUTE MANUAL: 0.1 K/UL (ref 0–0.2)
BASOPHIL: 1 % (ref 0–2)
BILIRUBIN TOTAL: 0.5 MG/DL (ref 0.2–1.3)
BUN SERPL-MCNC: 42 MG/DL (ref 7–17)
CALCIUM: 8.3 MG/DL (ref 8.4–10.2)
CARBON DIOXIDE: 23 MMOL/L (ref 22–30)
CHLORIDE: 110 MMOL/L (ref 98–107)
CREATININE: 1.64 MG/DL (ref 0.52–1.04)
EGFR FOR AFRICAN AMERICANS: 37
EGFR FOR NON-AFRICAN AMERICANS: 31
EOSINOPHIL ABSOLUTE MANUAL: 0.29 K/UL (ref 0–0.5)
EOSINOPHIL: 3 % (ref 0–4)
GLUCOSE: 196 MG/DL (ref 70–100)
HEMATOCRIT: 42.6 % (ref 37–47)
HEMOGLOBIN: 13.2 GM/DL (ref 12–16)
LD: 218 U/L (ref 120–246)
LYMPHOCYTE: 28 % (ref 22–44)
LYMPHPHOCYTES ABSOLUTE MANUAL: 4.75 K/UL (ref 1–4.8)
MEAN CORPUSCULAR HEMOGLOBIN: 27.8 PG/CELL (ref 27–35)
MEAN CORPUSCULAR HGB CONC: 31 G/DL (ref 32–36)
MEAN CORPUSCULAR VOLUME: 89.9 FL (ref 81–102)
MEAN PLATELET VOLUME: 13.3 FL (ref 7.4–12)
METAMYELOCYTE: 1 % (ref 0–0)
MONOCYTE: 6 % (ref 2–10)
MONOCYTES ABSOLUTE MANUAL: 0.57 K/UL (ref 0–0.8)
NEUTROPHILS (SEGS): 37 % (ref 40–70)
NEUTROPHILS ABSOLUTE MANUAL: 3.8 K/UL (ref 1.8–7.7)
PLATELET COUNT: 159 K/UL (ref 145–400)
PLATELET ESTIMATE: ADEQUATE
POTASSIUM SERPL-SCNC: 4.5 MMOL/L (ref 3.6–5)
RBC MORPHOLOGY: NORMAL
RED CELL COUNT: 4.74 M/UL (ref 3.8–5.1)
RED CELL DISTRIBUTION WIDTH: 14.9 % (ref 11.5–14.5)
SODIUM: 141 MMOL/L (ref 137–145)
TOTAL CELLS COUNTED: 100
TOTAL PROTEIN: 6.2 GM/DL (ref 6.3–8.3)
VARIENT LYMPHOCYTE: 22 % (ref 0–0)
WHITE BLOOD COUNT: 9.5 K/UL (ref 4.8–11.1)

## 2019-11-13 DIAGNOSIS — I1A.0 RESISTANT HYPERTENSION: ICD-10-CM

## 2019-11-13 RX ORDER — METOPROLOL SUCCINATE 25 MG/1
TABLET, EXTENDED RELEASE ORAL
Qty: 90 TABLET | Refills: 1 | Status: SHIPPED | OUTPATIENT
Start: 2019-11-13 | End: 2020-02-27 | Stop reason: SINTOL

## 2019-12-01 ENCOUNTER — PRIOR ORIGINAL RECORDS (OUTPATIENT)
Dept: HEALTH INFORMATION MANAGEMENT | Facility: OTHER | Age: 74
End: 2019-12-01

## 2019-12-11 DIAGNOSIS — E11.9 TYPE 2 DIABETES MELLITUS WITHOUT COMPLICATION, WITH LONG-TERM CURRENT USE OF INSULIN (CMD): ICD-10-CM

## 2019-12-11 DIAGNOSIS — Z79.4 TYPE 2 DIABETES MELLITUS WITHOUT COMPLICATION, WITH LONG-TERM CURRENT USE OF INSULIN (CMD): ICD-10-CM

## 2019-12-11 RX ORDER — CALCIUM CITRATE/VITAMIN D3 200MG-6.25
TABLET ORAL
Qty: 300 STRIP | Refills: 3 | Status: SHIPPED | OUTPATIENT
Start: 2019-12-11 | End: 2019-12-11 | Stop reason: SDUPTHER

## 2020-01-07 LAB
ALBUMIN: 4 GM/DL (ref 3.5–5)
ALKALINE PHOSPHATASE: 92 U/L (ref 38–126)
ALT: 41 U/L (ref 4–34)
ANION GAP: 11 MEQ/L (ref 8–16)
AST: 29 U/L (ref 14–53)
BASO #: 0.04 K/UL (ref 0–0.2)
BASO %: 1 % (ref 0–2)
BILIRUBIN TOTAL: 0.6 MG/DL (ref 0.2–1.3)
BUN SERPL-MCNC: 40 MG/DL (ref 7–17)
CALCIUM: 9.9 MG/DL (ref 8.4–10.2)
CARBON DIOXIDE: 21 MMOL/L (ref 22–30)
CHLORIDE: 107 MMOL/L (ref 98–107)
CREATININE: 1.53 MG/DL (ref 0.52–1.04)
DIFFERENTIAL COMMENT: NORMAL
EGFR FOR AFRICAN AMERICANS: 40
EGFR FOR NON-AFRICAN AMERICANS: 33
EOS #: 0.17 K/UL (ref 0–0.5)
EOS %: 2 % (ref 0–4)
GLUCOSE: 239 MG/DL (ref 70–100)
HEMATOCRIT: 43.1 % (ref 37–47)
HEMOGLOBIN: 13.4 GM/DL (ref 12–16)
LD: 219 U/L (ref 120–246)
LYMPH #: 4.1 K/UL (ref 1–4.8)
LYMPH %: 46 % (ref 22–44)
MEAN CORPUSCULAR HEMOGLOBIN: 27.9 PG/CELL (ref 27–35)
MEAN CORPUSCULAR HGB CONC: 31.1 G/DL (ref 32–36)
MEAN CORPUSCULAR VOLUME: 89.8 FL (ref 81–102)
MEAN PLATELET VOLUME: 13.3 FL (ref 7.4–12)
MONO #: 0.71 K/UL (ref 0–0.8)
MONO %: 8 % (ref 2–10)
NEUTROPHILS #: 3.86 K/UL (ref 1.8–7.7)
NEUTROPHILS %: 43 % (ref 40–70)
PLATELET COUNT: 160 K/UL (ref 145–400)
PLATELET ESTIMATE: ADEQUATE
POTASSIUM SERPL-SCNC: 5.1 MMOL/L (ref 3.6–5)
RBC MORPHOLOGY: NORMAL
RED CELL COUNT: 4.8 M/UL (ref 3.8–5.1)
RED CELL DISTRIBUTION WIDTH: 14 % (ref 11.5–14.5)
SODIUM: 139 MMOL/L (ref 137–145)
TOTAL PROTEIN: 6.4 GM/DL (ref 6.3–8.3)
WHITE BLOOD COUNT: 8.9 K/UL (ref 4.8–11.1)

## 2020-01-20 ENCOUNTER — OFFICE VISIT (OUTPATIENT)
Dept: FAMILY MEDICINE | Age: 75
End: 2020-01-20

## 2020-01-20 DIAGNOSIS — E11.9 TYPE 2 DIABETES MELLITUS WITH INSULIN THERAPY (CMD): Primary | ICD-10-CM

## 2020-01-20 DIAGNOSIS — H61.21 IMPACTED CERUMEN OF RIGHT EAR: ICD-10-CM

## 2020-01-20 DIAGNOSIS — Z79.4 TYPE 2 DIABETES MELLITUS WITH INSULIN THERAPY (CMD): Primary | ICD-10-CM

## 2020-01-20 DIAGNOSIS — M48.061 LUMBAR FORAMINAL STENOSIS: ICD-10-CM

## 2020-01-20 DIAGNOSIS — N18.30 CKD (CHRONIC KIDNEY DISEASE), STAGE III (CMD): ICD-10-CM

## 2020-01-20 DIAGNOSIS — I1A.0 RESISTANT HYPERTENSION: ICD-10-CM

## 2020-01-20 DIAGNOSIS — E78.5 HYPERLIPIDEMIA, UNSPECIFIED HYPERLIPIDEMIA TYPE: ICD-10-CM

## 2020-01-20 LAB — HEMOGLOBIN A1C (HA1C): 9.4 %NGSP

## 2020-01-20 PROCEDURE — 80307 DRUG TEST PRSMV CHEM ANLYZR: CPT | Performed by: FAMILY MEDICINE

## 2020-01-20 PROCEDURE — 69209 REMOVE IMPACTED EAR WAX UNI: CPT | Performed by: FAMILY MEDICINE

## 2020-01-20 PROCEDURE — 99214 OFFICE O/P EST MOD 30 MIN: CPT | Performed by: FAMILY MEDICINE

## 2020-01-20 RX ORDER — HYDROCODONE BITARTRATE AND ACETAMINOPHEN 5; 325 MG/1; MG/1
TABLET ORAL
Qty: 60 TABLET | Refills: 0 | Status: SHIPPED | OUTPATIENT
Start: 2020-01-20 | End: 2020-05-21 | Stop reason: SDUPTHER

## 2020-01-20 RX ORDER — SPIRONOLACTONE 25 MG/1
25 TABLET ORAL NIGHTLY
COMMUNITY
Start: 2020-01-15 | End: 2020-02-27 | Stop reason: SDUPTHER

## 2020-01-20 RX ORDER — HYDRALAZINE HYDROCHLORIDE 10 MG/1
TABLET, FILM COATED ORAL
COMMUNITY
Start: 2019-12-03 | End: 2020-01-20 | Stop reason: CLARIF

## 2020-01-20 RX ORDER — IBRUTINIB 140 MG/1
CAPSULE ORAL
COMMUNITY
Start: 2020-01-09

## 2020-01-20 ASSESSMENT — PATIENT HEALTH QUESTIONNAIRE - PHQ9
1. LITTLE INTEREST OR PLEASURE IN DOING THINGS: NOT AT ALL
2. FEELING DOWN, DEPRESSED OR HOPELESS: NOT AT ALL
SUM OF ALL RESPONSES TO PHQ9 QUESTIONS 1 AND 2: 0
SUM OF ALL RESPONSES TO PHQ9 QUESTIONS 1 AND 2: 0

## 2020-01-20 ASSESSMENT — PAIN SCALES - GENERAL: PAINLEVEL: 0

## 2020-01-24 LAB
DRUG MONITORING REPORT (QDMRPT): NORMAL
SERVICE CMNT-IMP: NORMAL

## 2020-02-06 ENCOUNTER — APPOINTMENT (OUTPATIENT)
Dept: FAMILY MEDICINE | Age: 75
End: 2020-02-06

## 2020-02-06 DIAGNOSIS — M81.0 AGE-RELATED OSTEOPOROSIS WITHOUT CURRENT PATHOLOGICAL FRACTURE: ICD-10-CM

## 2020-02-06 RX ORDER — ALENDRONATE SODIUM 70 MG/1
TABLET ORAL
Qty: 12 TABLET | Refills: 3 | Status: SHIPPED | OUTPATIENT
Start: 2020-02-06

## 2020-02-20 ENCOUNTER — NURSE ONLY (OUTPATIENT)
Dept: FAMILY MEDICINE | Age: 75
End: 2020-02-20

## 2020-02-20 VITALS — DIASTOLIC BLOOD PRESSURE: 78 MMHG | HEART RATE: 48 BPM | SYSTOLIC BLOOD PRESSURE: 160 MMHG

## 2020-02-20 DIAGNOSIS — I1A.0 RESISTANT HYPERTENSION: ICD-10-CM

## 2020-02-20 PROCEDURE — X1094 NO CHARGE VISIT: HCPCS

## 2020-02-27 ENCOUNTER — OFFICE VISIT (OUTPATIENT)
Dept: FAMILY MEDICINE | Age: 75
End: 2020-02-27

## 2020-02-27 VITALS
BODY MASS INDEX: 33.3 KG/M2 | RESPIRATION RATE: 18 BRPM | SYSTOLIC BLOOD PRESSURE: 150 MMHG | OXYGEN SATURATION: 96 % | DIASTOLIC BLOOD PRESSURE: 56 MMHG | HEART RATE: 54 BPM | TEMPERATURE: 98.4 F | WEIGHT: 194 LBS

## 2020-02-27 DIAGNOSIS — R00.1 SINUS BRADYCARDIA: ICD-10-CM

## 2020-02-27 DIAGNOSIS — N18.30 CKD (CHRONIC KIDNEY DISEASE), STAGE III (CMD): ICD-10-CM

## 2020-02-27 DIAGNOSIS — R53.83 FATIGUE, UNSPECIFIED TYPE: ICD-10-CM

## 2020-02-27 DIAGNOSIS — I1A.0 RESISTANT HYPERTENSION: Primary | ICD-10-CM

## 2020-02-27 PROCEDURE — 99214 OFFICE O/P EST MOD 30 MIN: CPT | Performed by: FAMILY MEDICINE

## 2020-02-27 RX ORDER — SPIRONOLACTONE 25 MG/1
25 TABLET ORAL 2 TIMES DAILY
Qty: 60 TABLET | Refills: 11 | Status: SHIPPED | OUTPATIENT
Start: 2020-02-27 | End: 2020-03-12 | Stop reason: SDUPTHER

## 2020-02-27 ASSESSMENT — ENCOUNTER SYMPTOMS
DIZZINESS: 0
ABDOMINAL PAIN: 0
EYE PAIN: 0
VOMITING: 0
CHEST TIGHTNESS: 0
LIGHT-HEADEDNESS: 0
CHILLS: 0
DIARRHEA: 0
FEVER: 0
FATIGUE: 0
HEADACHES: 0
NAUSEA: 0
UNEXPECTED WEIGHT CHANGE: 0
SHORTNESS OF BREATH: 0

## 2020-02-28 ENCOUNTER — TELEPHONE (OUTPATIENT)
Dept: FAMILY MEDICINE | Age: 75
End: 2020-02-28

## 2020-02-28 DIAGNOSIS — Z12.39 SCREENING FOR BREAST CANCER: Primary | ICD-10-CM

## 2020-03-02 DIAGNOSIS — Z12.39 SCREENING FOR BREAST CANCER: ICD-10-CM

## 2020-03-12 ENCOUNTER — NURSE ONLY (OUTPATIENT)
Dept: FAMILY MEDICINE | Age: 75
End: 2020-03-12

## 2020-03-12 VITALS — DIASTOLIC BLOOD PRESSURE: 62 MMHG | SYSTOLIC BLOOD PRESSURE: 164 MMHG | HEART RATE: 58 BPM

## 2020-03-12 DIAGNOSIS — I1A.0 RESISTANT HYPERTENSION: ICD-10-CM

## 2020-03-12 LAB
ANION GAP: 10 MEQ/L (ref 8–16)
BUN SERPL-MCNC: 39 MG/DL (ref 7–17)
CALCIUM: 9.5 MG/DL (ref 8.4–10.2)
CARBON DIOXIDE: 19 MMOL/L (ref 22–30)
CHLORIDE: 108 MMOL/L (ref 98–107)
CREATININE: 1.81 MG/DL (ref 0.52–1.04)
EGFR FOR AFRICAN AMERICANS: 33
EGFR FOR NON-AFRICAN AMERICANS: 27
GLUCOSE: 221 MG/DL (ref 70–100)
POTASSIUM SERPL-SCNC: 6 MMOL/L (ref 3.6–5)
SODIUM: 137 MMOL/L (ref 137–145)

## 2020-03-12 PROCEDURE — 99211 OFF/OP EST MAY X REQ PHY/QHP: CPT | Performed by: FAMILY MEDICINE

## 2020-03-12 RX ORDER — SPIRONOLACTONE 25 MG/1
25 TABLET ORAL DAILY
Qty: 30 TABLET | Refills: 11 | Status: SHIPPED | OUTPATIENT
Start: 2020-03-12 | End: 2020-03-13 | Stop reason: SDUPTHER

## 2020-03-12 RX ORDER — SPIRONOLACTONE 25 MG/1
25 TABLET ORAL DAILY
Qty: 30 TABLET | Refills: 11 | Status: SHIPPED | OUTPATIENT
Start: 2020-03-12 | End: 2020-03-12 | Stop reason: SDUPTHER

## 2020-03-13 ENCOUNTER — TELEPHONE (OUTPATIENT)
Dept: FAMILY MEDICINE | Age: 75
End: 2020-03-13

## 2020-03-13 RX ORDER — SPIRONOLACTONE 25 MG/1
25 TABLET ORAL DAILY
Qty: 30 TABLET | Refills: 11 | Status: SHIPPED | OUTPATIENT
Start: 2020-03-13 | End: 2020-04-01 | Stop reason: SINTOL

## 2020-03-17 ENCOUNTER — TELEPHONE (OUTPATIENT)
Dept: FAMILY MEDICINE | Age: 75
End: 2020-03-17

## 2020-03-17 DIAGNOSIS — N18.30 CKD (CHRONIC KIDNEY DISEASE), STAGE III (CMD): Primary | ICD-10-CM

## 2020-03-17 LAB
ANION GAP: 9 MEQ/L (ref 8–16)
BUN SERPL-MCNC: 38 MG/DL (ref 7–17)
CALCIUM: 9.1 MG/DL (ref 8.4–10.2)
CARBON DIOXIDE: 18 MMOL/L (ref 22–30)
CHLORIDE: 113 MMOL/L (ref 98–107)
CREATININE: 1.71 MG/DL (ref 0.52–1.04)
EGFR FOR AFRICAN AMERICANS: 35
EGFR FOR NON-AFRICAN AMERICANS: 29
GLUCOSE: 186 MG/DL (ref 70–100)
POTASSIUM SERPL-SCNC: 5.7 MMOL/L (ref 3.6–5)
SODIUM: 140 MMOL/L (ref 137–145)

## 2020-03-18 ENCOUNTER — TELEPHONE (OUTPATIENT)
Dept: FAMILY MEDICINE | Age: 75
End: 2020-03-18

## 2020-03-18 DIAGNOSIS — R53.83 FATIGUE, UNSPECIFIED TYPE: ICD-10-CM

## 2020-03-18 DIAGNOSIS — I1A.0 RESISTANT HYPERTENSION: Primary | ICD-10-CM

## 2020-03-19 DIAGNOSIS — E11.9 TYPE 2 DIABETES MELLITUS WITH INSULIN THERAPY (CMD): ICD-10-CM

## 2020-03-19 DIAGNOSIS — Z79.4 TYPE 2 DIABETES MELLITUS WITH INSULIN THERAPY (CMD): ICD-10-CM

## 2020-03-19 DIAGNOSIS — E78.5 HYPERLIPIDEMIA, UNSPECIFIED HYPERLIPIDEMIA TYPE: ICD-10-CM

## 2020-03-19 RX ORDER — GLIPIZIDE 5 MG/1
5 TABLET ORAL
Qty: 90 TABLET | Refills: 1 | Status: SHIPPED | OUTPATIENT
Start: 2020-03-19 | End: 2020-05-21 | Stop reason: ALTCHOICE

## 2020-03-19 RX ORDER — ATORVASTATIN CALCIUM 10 MG/1
TABLET, FILM COATED ORAL
Qty: 90 TABLET | Refills: 1 | Status: SHIPPED | OUTPATIENT
Start: 2020-03-19

## 2020-03-26 DIAGNOSIS — I10 ESSENTIAL HYPERTENSION: ICD-10-CM

## 2020-03-26 RX ORDER — AMLODIPINE BESYLATE 10 MG/1
TABLET ORAL
Qty: 90 TABLET | Refills: 3 | Status: SHIPPED | OUTPATIENT
Start: 2020-03-26

## 2020-03-31 ENCOUNTER — NURSE ONLY (OUTPATIENT)
Dept: FAMILY MEDICINE | Age: 75
End: 2020-03-31

## 2020-03-31 DIAGNOSIS — N18.30 CKD (CHRONIC KIDNEY DISEASE), STAGE III (CMD): ICD-10-CM

## 2020-03-31 PROCEDURE — 36415 COLL VENOUS BLD VENIPUNCTURE: CPT

## 2020-03-31 PROCEDURE — 80048 BASIC METABOLIC PNL TOTAL CA: CPT | Performed by: FAMILY MEDICINE

## 2020-04-01 ENCOUNTER — TELEPHONE (OUTPATIENT)
Dept: FAMILY MEDICINE | Age: 75
End: 2020-04-01

## 2020-04-01 LAB
ANION GAP SERPL CALC-SCNC: 12 MMOL/L (ref 10–20)
BUN SERPL-MCNC: 47 MG/DL (ref 6–20)
BUN/CREAT SERPL: 31 (ref 7–25)
CALCIUM SERPL-MCNC: 8.5 MG/DL (ref 8.4–10.2)
CHLORIDE SERPL-SCNC: 113 MMOL/L (ref 98–107)
CO2 SERPL-SCNC: 22 MMOL/L (ref 21–32)
CREAT SERPL-MCNC: 1.54 MG/DL (ref 0.51–0.95)
GLUCOSE SERPL-MCNC: 136 MG/DL (ref 65–99)
LENGTH OF FAST TIME PATIENT: ABNORMAL HRS
POTASSIUM SERPL-SCNC: 4.5 MMOL/L (ref 3.4–5.1)
SODIUM SERPL-SCNC: 143 MMOL/L (ref 135–145)

## 2020-04-13 LAB
BASO #: 0.04 K/UL (ref 0–0.2)
BASO %: 0 % (ref 0–2)
DIFFERENTIAL COMMENT: NORMAL
EOS #: 0.19 K/UL (ref 0–0.5)
EOS %: 2 % (ref 0–4)
HEMATOCRIT: 41.9 % (ref 37–47)
HEMOGLOBIN: 13.4 GM/DL (ref 12–16)
LYMPH #: 3.64 K/UL (ref 1–4.8)
LYMPH %: 38 % (ref 22–44)
MEAN CORPUSCULAR HEMOGLOBIN: 28.8 PG/CELL (ref 27–35)
MEAN CORPUSCULAR HGB CONC: 32 G/DL (ref 32–36)
MEAN CORPUSCULAR VOLUME: 90.1 FL (ref 81–102)
MEAN PLATELET VOLUME: 13.2 FL (ref 7.4–12)
MONO #: 0.76 K/UL (ref 0–0.8)
MONO %: 8 % (ref 2–10)
NEUTROPHILS #: 4.89 K/UL (ref 1.8–7.7)
NEUTROPHILS %: 51 % (ref 40–70)
PLATELET COUNT: 151 K/UL (ref 145–400)
PLATELET ESTIMATE: ADEQUATE
RBC MORPHOLOGY: NORMAL
RED CELL COUNT: 4.65 M/UL (ref 3.8–5.1)
RED CELL DISTRIBUTION WIDTH: 14.6 % (ref 11.5–14.5)
WHITE BLOOD COUNT: 9.5 K/UL (ref 4.8–11.1)

## 2020-04-29 DIAGNOSIS — E11.9 TYPE 2 DIABETES MELLITUS WITH INSULIN THERAPY (CMD): ICD-10-CM

## 2020-04-29 DIAGNOSIS — Z79.4 TYPE 2 DIABETES MELLITUS WITH INSULIN THERAPY (CMD): ICD-10-CM

## 2020-04-29 RX ORDER — INSULIN GLARGINE 100 [IU]/ML
INJECTION, SOLUTION SUBCUTANEOUS
Qty: 80 ML | Refills: 1 | Status: SHIPPED | OUTPATIENT
Start: 2020-04-29 | End: 2020-05-21 | Stop reason: SDUPTHER

## 2020-05-20 LAB
BAND: 10 % (ref 2–6)
CHOL/HDL RATIO (EUR): 3.1 RATIO (ref 3.7–5.6)
CHOLESTEROL: 120 MG/DL
EOSINOPHIL ABSOLUTE MANUAL: 0.1 K/UL (ref 0–0.5)
EOSINOPHIL: 1 % (ref 0–4)
HDL CHOLESTEROL: 39 MG/DL
HEMATOCRIT: 43.7 % (ref 37–47)
HEMOGLOBIN A1C (HA1C): 8.8 %NGSP
HEMOGLOBIN: 13.8 GM/DL (ref 12–16)
LDL CHOLESTEROL (CALCULATED): 43 MG/DL
LYMPHOCYTE: 17 % (ref 22–44)
LYMPHPHOCYTES ABSOLUTE MANUAL: 1.87 K/UL (ref 1–4.8)
MEAN CORPUSCULAR HEMOGLOBIN: 28.7 PG/CELL (ref 27–35)
MEAN CORPUSCULAR HGB CONC: 31.6 G/DL (ref 32–36)
MEAN CORPUSCULAR VOLUME: 90.9 FL (ref 81–102)
MEAN PLATELET VOLUME: 12.9 FL (ref 7.4–12)
METAMYELOCYTE: 2 % (ref 0–0)
MONOCYTE: 4 % (ref 2–10)
MONOCYTES ABSOLUTE MANUAL: 0.42 K/UL (ref 0–0.8)
NEUTROPHILS (SEGS): 65 % (ref 40–70)
NEUTROPHILS ABSOLUTE MANUAL: 8.01 K/UL (ref 1.8–7.7)
PLATELET COUNT: 160 K/UL (ref 145–400)
PLATELET ESTIMATE: ADEQUATE
RBC MORPHOLOGY: NORMAL
RED CELL COUNT: 4.81 M/UL (ref 3.8–5.1)
RED CELL DISTRIBUTION WIDTH: 14 % (ref 11.5–14.5)
TOTAL CELLS COUNTED: 100
TRIGLYCERIDES: 192 MG/DL
VARIENT LYMPHOCYTE: 1 % (ref 0–0)
VLDL (CALC) (EUR): 38 MG/DL (ref 10–55)
WHITE BLOOD COUNT: 10.4 K/UL (ref 4.8–11.1)

## 2020-05-21 ENCOUNTER — OFFICE VISIT (OUTPATIENT)
Dept: FAMILY MEDICINE | Age: 75
End: 2020-05-21

## 2020-05-21 VITALS — BODY MASS INDEX: 32.79 KG/M2 | WEIGHT: 191 LBS | TEMPERATURE: 97.3 F

## 2020-05-21 DIAGNOSIS — I1A.0 RESISTANT HYPERTENSION: ICD-10-CM

## 2020-05-21 DIAGNOSIS — M48.061 LUMBAR FORAMINAL STENOSIS: ICD-10-CM

## 2020-05-21 DIAGNOSIS — N18.30 CKD (CHRONIC KIDNEY DISEASE), STAGE III (CMD): ICD-10-CM

## 2020-05-21 DIAGNOSIS — E11.9 TYPE 2 DIABETES MELLITUS WITH INSULIN THERAPY (CMD): ICD-10-CM

## 2020-05-21 DIAGNOSIS — E78.5 HYPERLIPIDEMIA, UNSPECIFIED HYPERLIPIDEMIA TYPE: ICD-10-CM

## 2020-05-21 DIAGNOSIS — C91.10 CHRONIC LYMPHOCYTIC LEUKEMIA (CMD): ICD-10-CM

## 2020-05-21 DIAGNOSIS — Z79.4 TYPE 2 DIABETES MELLITUS WITH INSULIN THERAPY (CMD): ICD-10-CM

## 2020-05-21 DIAGNOSIS — R60.0 EDEMA OF BOTH LEGS: Primary | ICD-10-CM

## 2020-05-21 PROCEDURE — 99214 OFFICE O/P EST MOD 30 MIN: CPT | Performed by: FAMILY MEDICINE

## 2020-05-21 RX ORDER — INSULIN GLARGINE 100 [IU]/ML
30 INJECTION, SOLUTION SUBCUTANEOUS 2 TIMES DAILY
Qty: 60 ML | Refills: 3 | Status: SHIPPED | OUTPATIENT
Start: 2020-05-21

## 2020-05-21 RX ORDER — FUROSEMIDE 40 MG/1
40 TABLET ORAL
Qty: 40 TABLET | Refills: 3 | Status: SHIPPED | OUTPATIENT
Start: 2020-05-22

## 2020-05-21 RX ORDER — HYDROCODONE BITARTRATE AND ACETAMINOPHEN 5; 325 MG/1; MG/1
TABLET ORAL
Qty: 60 TABLET | Refills: 0 | Status: SHIPPED | OUTPATIENT
Start: 2020-05-21

## 2020-05-21 SDOH — HEALTH STABILITY: MENTAL HEALTH: HOW OFTEN DO YOU HAVE A DRINK CONTAINING ALCOHOL?: NEVER

## 2020-05-21 ASSESSMENT — ENCOUNTER SYMPTOMS
CHILLS: 0
DIARRHEA: 0
ABDOMINAL PAIN: 0
VOMITING: 0
NAUSEA: 0
HEADACHES: 0
UNEXPECTED WEIGHT CHANGE: 0
LIGHT-HEADEDNESS: 0
DIZZINESS: 0
FATIGUE: 0
SHORTNESS OF BREATH: 0
CHEST TIGHTNESS: 0

## 2020-05-21 ASSESSMENT — PATIENT HEALTH QUESTIONNAIRE - PHQ9
SUM OF ALL RESPONSES TO PHQ9 QUESTIONS 1 AND 2: 0
1. LITTLE INTEREST OR PLEASURE IN DOING THINGS: NOT AT ALL
2. FEELING DOWN, DEPRESSED OR HOPELESS: NOT AT ALL
SUM OF ALL RESPONSES TO PHQ9 QUESTIONS 1 AND 2: 0
CLINICAL INTERPRETATION OF PHQ9 SCORE: NO FURTHER SCREENING NEEDED
CLINICAL INTERPRETATION OF PHQ2 SCORE: NO FURTHER SCREENING NEEDED

## 2020-05-23 ENCOUNTER — TELEPHONE (OUTPATIENT)
Dept: FAMILY MEDICINE | Age: 75
End: 2020-05-23

## 2020-06-18 ENCOUNTER — TELEPHONE (OUTPATIENT)
Dept: FAMILY MEDICINE | Age: 75
End: 2020-06-18

## 2020-06-18 DIAGNOSIS — E11.9 TYPE 2 DIABETES MELLITUS WITH INSULIN THERAPY (CMD): ICD-10-CM

## 2020-06-18 DIAGNOSIS — I10 ESSENTIAL HYPERTENSION: ICD-10-CM

## 2020-06-18 DIAGNOSIS — Z79.4 TYPE 2 DIABETES MELLITUS WITH INSULIN THERAPY (CMD): ICD-10-CM

## 2020-06-18 RX ORDER — LOSARTAN POTASSIUM 100 MG/1
TABLET ORAL
Qty: 90 TABLET | Refills: 3 | Status: SHIPPED | OUTPATIENT
Start: 2020-06-18

## 2020-06-22 DIAGNOSIS — I10 ESSENTIAL HYPERTENSION: ICD-10-CM

## 2020-06-22 RX ORDER — HYDRALAZINE HYDROCHLORIDE 10 MG/1
TABLET, FILM COATED ORAL
Qty: 270 TABLET | Refills: 3 | OUTPATIENT
Start: 2020-06-22

## 2020-06-22 RX ORDER — CHLORTHALIDONE 25 MG/1
TABLET ORAL
Status: SHIPPED | COMMUNITY
Start: 2020-06-22

## 2020-06-26 LAB
ALBUMIN: 3.8 GM/DL (ref 3.5–5)
ALKALINE PHOSPHATASE: 108 U/L (ref 38–126)
ALT: 109 U/L (ref 4–34)
ANION GAP: 9 MEQ/L (ref 8–16)
AST: 53 U/L (ref 14–53)
BILIRUBIN TOTAL: 0.7 MG/DL (ref 0.2–1.3)
BUN SERPL-MCNC: 50 MG/DL (ref 7–17)
CALCIUM: 9.4 MG/DL (ref 8.4–10.2)
CARBON DIOXIDE: 24 MMOL/L (ref 22–30)
CHLORIDE: 106 MMOL/L (ref 98–107)
CREATININE: 1.56 MG/DL (ref 0.52–1.04)
EGFR FOR AFRICAN AMERICANS: 39
EGFR FOR NON-AFRICAN AMERICANS: 32
GLUCOSE: 243 MG/DL (ref 70–100)
LD: 567 U/L (ref 120–246)
POTASSIUM SERPL-SCNC: 4.9 MMOL/L (ref 3.6–5)
SODIUM: 139 MMOL/L (ref 137–145)
TOTAL PROTEIN: 6.5 GM/DL (ref 6.3–8.3)

## 2020-06-27 LAB
ALBUMIN: 3.6 GM/DL (ref 3.5–5)
ALKALINE PHOSPHATASE: 80 U/L (ref 38–126)
ALT: 23 U/L (ref 4–34)
ANION GAP: 9 MEQ/L (ref 8–16)
AST: 22 U/L (ref 14–53)
BILIRUBIN TOTAL: 0.6 MG/DL (ref 0.2–1.3)
BUN SERPL-MCNC: 34 MG/DL (ref 7–17)
CALCIUM: 8.6 MG/DL (ref 8.4–10.2)
CARBON DIOXIDE: 21 MMOL/L (ref 22–30)
CHLORIDE: 108 MMOL/L (ref 98–107)
CREATININE: 1.67 MG/DL (ref 0.52–1.04)
EGFR FOR AFRICAN AMERICANS: 36
EGFR FOR NON-AFRICAN AMERICANS: 30
GLUCOSE: 162 MG/DL (ref 70–100)
LD: 509 U/L (ref 120–246)
POTASSIUM SERPL-SCNC: 4.6 MMOL/L (ref 3.6–5)
SODIUM: 138 MMOL/L (ref 137–145)
TOTAL PROTEIN: 6.2 GM/DL (ref 6.3–8.3)

## 2020-06-29 LAB
ALBUMIN: 3.8 GM/DL (ref 3.5–5)
ANION GAP: 12 MEQ/L (ref 8–16)
BUN SERPL-MCNC: 51 MG/DL (ref 7–17)
CALCIUM: 8.6 MG/DL (ref 8.4–10.2)
CARBON DIOXIDE: 18 MMOL/L (ref 22–30)
CHLORIDE: 105 MMOL/L (ref 98–107)
CREATININE: 2.01 MG/DL (ref 0.52–1.04)
EGFR FOR AFRICAN AMERICANS: 29
EGFR FOR NON-AFRICAN AMERICANS: 24
GLUCOSE: 206 MG/DL (ref 70–100)
PHOSPHORUS: 4.1 MG/DL (ref 2.5–4.5)
POTASSIUM SERPL-SCNC: 4.4 MMOL/L (ref 3.6–5)
SODIUM: 135 MMOL/L (ref 137–145)

## 2020-08-06 ENCOUNTER — CASE MANAGEMENT (OUTPATIENT)
Dept: OTHER | Age: 75
End: 2020-08-06

## 2020-08-11 ENCOUNTER — TELEPHONE ENCOUNTER (OUTPATIENT)
Dept: URBAN - METROPOLITAN AREA CLINIC 54 | Facility: CLINIC | Age: 75
End: 2020-08-11

## 2020-08-11 RX ORDER — SODIUM, POTASSIUM,MAG SULFATES 17.5-3.13G
354 ML SOLUTION, RECONSTITUTED, ORAL ORAL ONCE
Qty: 354 MILLILITER | Refills: 0 | OUTPATIENT
Start: 2020-08-13 | End: 2020-08-14

## 2021-04-07 ENCOUNTER — TELEPHONE ENCOUNTER (OUTPATIENT)
Dept: URBAN - METROPOLITAN AREA CLINIC 54 | Facility: CLINIC | Age: 76
End: 2021-04-07

## 2021-04-07 RX ORDER — DEXLANSOPRAZOLE 60 MG/1
1 CAPSULE CAPSULE, DELAYED RELEASE ORAL ONCE A DAY
Qty: 30 | OUTPATIENT
Start: 2021-04-08

## 2021-05-24 ENCOUNTER — OFFICE VISIT (OUTPATIENT)
Dept: URBAN - METROPOLITAN AREA CLINIC 54 | Facility: CLINIC | Age: 76
End: 2021-05-24

## 2021-05-25 VITALS
RESPIRATION RATE: 18 BRPM | WEIGHT: 196.8 LBS | RESPIRATION RATE: 16 BRPM | RESPIRATION RATE: 16 BRPM | SYSTOLIC BLOOD PRESSURE: 156 MMHG | OXYGEN SATURATION: 97 % | WEIGHT: 191.8 LBS | DIASTOLIC BLOOD PRESSURE: 52 MMHG | TEMPERATURE: 96.5 F | HEART RATE: 57 BPM | HEART RATE: 67 BPM | WEIGHT: 195 LBS | TEMPERATURE: 97.5 F | BODY MASS INDEX: 32.78 KG/M2 | OXYGEN SATURATION: 95 % | BODY MASS INDEX: 32.74 KG/M2 | SYSTOLIC BLOOD PRESSURE: 130 MMHG | HEART RATE: 63 BPM | BODY MASS INDEX: 33.29 KG/M2 | OXYGEN SATURATION: 94 % | RESPIRATION RATE: 16 BRPM | WEIGHT: 192 LBS | TEMPERATURE: 97.3 F | HEIGHT: 64 IN | TEMPERATURE: 97.1 F | BODY MASS INDEX: 33.78 KG/M2 | SYSTOLIC BLOOD PRESSURE: 150 MMHG | TEMPERATURE: 96.9 F | WEIGHT: 194.4 LBS | SYSTOLIC BLOOD PRESSURE: 158 MMHG | TEMPERATURE: 97.2 F | BODY MASS INDEX: 33.26 KG/M2 | DIASTOLIC BLOOD PRESSURE: 60 MMHG | DIASTOLIC BLOOD PRESSURE: 56 MMHG | HEART RATE: 64 BPM | HEART RATE: 56 BPM | DIASTOLIC BLOOD PRESSURE: 58 MMHG | DIASTOLIC BLOOD PRESSURE: 60 MMHG | HEART RATE: 49 BPM | OXYGEN SATURATION: 95 % | OXYGEN SATURATION: 95 % | BODY MASS INDEX: 33.19 KG/M2 | SYSTOLIC BLOOD PRESSURE: 150 MMHG | HEIGHT: 64 IN | RESPIRATION RATE: 16 BRPM | HEIGHT: 64 IN | SYSTOLIC BLOOD PRESSURE: 150 MMHG | WEIGHT: 194.8 LBS | HEIGHT: 64 IN | OXYGEN SATURATION: 95 % | HEIGHT: 64 IN | RESPIRATION RATE: 16 BRPM | DIASTOLIC BLOOD PRESSURE: 62 MMHG

## 2021-06-18 ENCOUNTER — OFFICE VISIT (OUTPATIENT)
Dept: URBAN - METROPOLITAN AREA CLINIC 54 | Facility: CLINIC | Age: 76
End: 2021-06-18

## 2021-06-18 RX ORDER — DEXLANSOPRAZOLE 60 MG/1
1 CAPSULE CAPSULE, DELAYED RELEASE ORAL ONCE A DAY
Qty: 30 | Status: ACTIVE | COMMUNITY
Start: 2021-04-08

## 2021-06-18 RX ORDER — OXYCODONE AND ACETAMINOPHEN 10; 325 MG/1; MG/1
TABLET ORAL
Qty: 0 | Refills: 0 | Status: ACTIVE | COMMUNITY
Start: 1900-01-01

## 2021-06-18 RX ORDER — DEXTROSE 4 G
TAKE 1 TABLET (10 MG) BY ORAL ROUTE ONCE DAILY TABLET,CHEWABLE ORAL 1
Qty: 0 | Refills: 0 | Status: ACTIVE | COMMUNITY
Start: 1900-01-01

## 2021-06-18 RX ORDER — DEXLANSOPRAZOLE 30 MG/1
CAPSULE, DELAYED RELEASE ORAL
Qty: 0 | Refills: 0 | Status: ACTIVE | COMMUNITY
Start: 1900-01-01

## 2021-06-18 RX ORDER — LORAZEPAM 0.5 MG/1
TABLET ORAL
Qty: 0 | Refills: 0 | Status: ACTIVE | COMMUNITY
Start: 1900-01-01

## 2021-06-18 RX ORDER — EZETIMIBE 10 MG/1
TAKE 1 TABLET (10 MG) BY ORAL ROUTE ONCE DAILY TABLET ORAL 1
Qty: 0 | Refills: 0 | Status: ACTIVE | COMMUNITY
Start: 1900-01-01

## 2021-08-13 ENCOUNTER — OFFICE VISIT (OUTPATIENT)
Dept: URBAN - METROPOLITAN AREA CLINIC 54 | Facility: CLINIC | Age: 76
End: 2021-08-13
Payer: MEDICARE

## 2021-08-13 ENCOUNTER — WEB ENCOUNTER (OUTPATIENT)
Dept: URBAN - METROPOLITAN AREA CLINIC 54 | Facility: CLINIC | Age: 76
End: 2021-08-13

## 2021-08-13 VITALS
SYSTOLIC BLOOD PRESSURE: 107 MMHG | DIASTOLIC BLOOD PRESSURE: 66 MMHG | TEMPERATURE: 97.2 F | HEART RATE: 95 BPM | BODY MASS INDEX: 34.55 KG/M2 | HEIGHT: 61 IN | WEIGHT: 183 LBS

## 2021-08-13 DIAGNOSIS — K59.03 DRUG INDUCED CONSTIPATION: ICD-10-CM

## 2021-08-13 DIAGNOSIS — K57.92 DIVERTICULITIS: ICD-10-CM

## 2021-08-13 DIAGNOSIS — K56.609 SMALL BOWEL OBSTRUCTION: ICD-10-CM

## 2021-08-13 DIAGNOSIS — K63.5 COLON POLYPS: ICD-10-CM

## 2021-08-13 DIAGNOSIS — T40.2X5A ADVERSE EFFECT OF OTHER OPIOIDS, INITIAL ENCOUNTER: ICD-10-CM

## 2021-08-13 DIAGNOSIS — K21.9 GERD: ICD-10-CM

## 2021-08-13 PROCEDURE — 99213 OFFICE O/P EST LOW 20 MIN: CPT | Performed by: INTERNAL MEDICINE

## 2021-08-13 RX ORDER — MULTIVIT WITH MINERALS/LUTEIN
AS DIRECTED TABLET ORAL
Status: ACTIVE | COMMUNITY

## 2021-08-13 RX ORDER — EZETIMIBE 10 MG/1
TAKE 1 TABLET (10 MG) BY ORAL ROUTE ONCE DAILY TABLET ORAL 1
Qty: 0 | Refills: 0 | Status: ACTIVE | COMMUNITY
Start: 1900-01-01

## 2021-08-13 RX ORDER — LEVOTHYROXINE SODIUM 0.03 MG/1
1 TABLET IN THE MORNING ON AN EMPTY STOMACH TABLET ORAL ONCE A DAY
Status: ACTIVE | COMMUNITY

## 2021-08-13 RX ORDER — OXYCODONE HYDROCHLORIDE AND ACETAMINOPHEN 5; 325 MG/1; MG/1
1 TABLET AS NEEDED TABLET ORAL
Status: ACTIVE | COMMUNITY

## 2021-08-13 RX ORDER — OXYCODONE AND ACETAMINOPHEN 10; 325 MG/1; MG/1
TABLET ORAL
Qty: 0 | Refills: 0 | Status: DISCONTINUED | COMMUNITY
Start: 1900-01-01

## 2021-08-13 RX ORDER — NEBIVOLOL HYDROCHLORIDE 5 MG/1
1 TABLET TABLET ORAL ONCE A DAY
Status: ACTIVE | COMMUNITY

## 2021-08-13 RX ORDER — DEXTROSE 4 G
TAKE 1 TABLET (10 MG) BY ORAL ROUTE ONCE DAILY TABLET,CHEWABLE ORAL 1
Qty: 0 | Refills: 0 | Status: DISCONTINUED | COMMUNITY
Start: 1900-01-01

## 2021-08-13 RX ORDER — GABAPENTIN 300 MG/1
1 CAPSULE CAPSULE ORAL ONCE A DAY
Status: ACTIVE | COMMUNITY

## 2021-08-13 RX ORDER — GLUCOSAMINE/CHONDR SU A SOD 750-600 MG
2 CAPSULES TABLET ORAL ONCE A DAY
Status: ACTIVE | COMMUNITY

## 2021-08-13 RX ORDER — DEXLANSOPRAZOLE 60 MG/1
1 CAPSULE CAPSULE, DELAYED RELEASE ORAL ONCE A DAY
Refills: 0 | Status: DISCONTINUED | COMMUNITY
Start: 1900-01-01

## 2021-08-13 RX ORDER — DEXLANSOPRAZOLE 60 MG/1
1 CAPSULE CAPSULE, DELAYED RELEASE ORAL ONCE A DAY
Qty: 30 | Status: ACTIVE | COMMUNITY
Start: 2021-04-08

## 2021-08-13 RX ORDER — LORAZEPAM 0.5 MG/1
TABLET ORAL
Qty: 0 | Refills: 0 | Status: DISCONTINUED | COMMUNITY
Start: 1900-01-01

## 2021-08-13 NOTE — HPI-TODAY'S VISIT:
This is a 73-year-old female who presents for follow-up.  Patient was admitted in February 2019 to [facility] in [place] after developing acute abdominal pain.  She was diagnosed with incarcerated hernia on imaging and underwent emergency exploratory laparotomy.  Unfortunately I do not have the operative report available to me at this time but she reports that she had significant intra-abdominal adhesion that required lysis of adhesion.  Postoperatively, she was readmitted with recurrent bowel obstruction which was treated conservatively with NG tube to low intermittent wall suction.  She was again hospitalized a week later after discharge due to worsening abdominal pain and nausea /vomiting.  She states that she underwent small-bowel follow-through which showed narrowed areas in the small bowel without obvious transition point.  She has longstanding history of chronic back pain and has been on narcotic analgesics for the past 5 years.  She has opioid induced constipation and has been using MiraLax.  She recently a try weaning herself off the narcotic analgesics and had withdrawal symptoms.  She was recently given trial with Relistor but has not yet taken the medication.  She underwent small-bowel follow-through on 05/08/2019 that revealed nonobstructive bowel gas patterns with enteric contrast reaching the colon by 1 hour and 50 min.  There was no evidence of partial small-bowel obstruction.  She states that she has stopped taking morphine and her abdominal pain and constipation is improved with MiraLax.  Follow Up 8/13/21: Patient presents for routine follow up. She is interested in a surveillance colonoscopy. Her last exam was ? 5 years ago with GAG and unknown findings. No close family history of  CRC. She is down to One percocet per day along with Miralax, stool softeners and stimulant laxatives along with fiber therapy. She has chronic back pain s/p surgical intervention in 2016. She also carries a diagnosis of GERD , HH and ? Armstrong's esophagus.

## 2021-08-13 NOTE — PHYSICAL EXAM GASTROINTESTINAL
Abdomen , soft, nontender, nondistended , healed vertical and colostomy scar, no guarding or rigidity , no masses palpable , normal bowel sounds , Liver and Spleen , no hepatomegaly present , no hepatosplenomegaly , liver nontender , spleen not palpable

## 2021-08-17 ENCOUNTER — TELEPHONE ENCOUNTER (OUTPATIENT)
Dept: URBAN - METROPOLITAN AREA CLINIC 92 | Facility: CLINIC | Age: 76
End: 2021-08-17

## 2021-11-23 ENCOUNTER — TELEPHONE ENCOUNTER (OUTPATIENT)
Dept: URBAN - METROPOLITAN AREA SURGERY CENTER 30 | Facility: SURGERY CENTER | Age: 76
End: 2021-11-23

## 2021-11-23 RX ORDER — DEXLANSOPRAZOLE 60 MG/1
1 CAPSULE CAPSULE, DELAYED RELEASE ORAL ONCE A DAY
Qty: 30 | Refills: 3
Start: 2021-04-08

## 2021-12-02 ENCOUNTER — TELEPHONE ENCOUNTER (OUTPATIENT)
Dept: URBAN - METROPOLITAN AREA CLINIC 54 | Facility: CLINIC | Age: 76
End: 2021-12-02

## 2021-12-02 RX ORDER — DEXLANSOPRAZOLE 60 MG/1
1 CAPSULE CAPSULE, DELAYED RELEASE ORAL ONCE A DAY
Qty: 90 CAPSULE | Refills: 1
Start: 2021-04-08

## 2022-02-14 ENCOUNTER — OFFICE VISIT (OUTPATIENT)
Dept: URBAN - METROPOLITAN AREA CLINIC 54 | Facility: CLINIC | Age: 77
End: 2022-02-14

## 2022-06-08 ENCOUNTER — ERX REFILL RESPONSE (OUTPATIENT)
Dept: URBAN - METROPOLITAN AREA CLINIC 54 | Facility: CLINIC | Age: 77
End: 2022-06-08

## 2022-06-08 RX ORDER — DEXLANSOPRAZOLE 60 MG/1
TAKE 1 CAPSULE ONCE A DAY CAPSULE, DELAYED RELEASE ORAL
Qty: 90 CAPSULE | Refills: 2 | OUTPATIENT

## 2022-06-08 RX ORDER — DEXLANSOPRAZOLE 60 MG/1
1 CAPSULE CAPSULE, DELAYED RELEASE ORAL ONCE A DAY
Qty: 90 CAPSULE | Refills: 1 | OUTPATIENT

## 2022-12-14 ENCOUNTER — ERX REFILL RESPONSE (OUTPATIENT)
Dept: URBAN - METROPOLITAN AREA CLINIC 54 | Facility: CLINIC | Age: 77
End: 2022-12-14

## 2022-12-14 RX ORDER — DEXLANSOPRAZOLE 60 MG/1
TAKE 1 CAPSULE ONCE A DAY CAPSULE, DELAYED RELEASE ORAL
Qty: 90 CAPSULE | Refills: 2 | OUTPATIENT

## 2022-12-14 RX ORDER — DEXLANSOPRAZOLE 60 MG/1
TAKE 1 CAPSULE ONCE A DAY CAPSULE, DELAYED RELEASE ORAL
Qty: 90 CAPSULE | Refills: 1 | OUTPATIENT

## 2023-05-24 ENCOUNTER — ERX REFILL RESPONSE (OUTPATIENT)
Dept: URBAN - METROPOLITAN AREA CLINIC 54 | Facility: CLINIC | Age: 78
End: 2023-05-24

## 2023-05-24 RX ORDER — DEXLANSOPRAZOLE 60 MG/1
TAKE 1 CAPSULE ONCE A DAY CAPSULE, DELAYED RELEASE ORAL
Qty: 90 CAPSULE | Refills: 1 | OUTPATIENT

## 2023-10-30 ENCOUNTER — ERX REFILL RESPONSE (OUTPATIENT)
Dept: URBAN - METROPOLITAN AREA CLINIC 54 | Facility: CLINIC | Age: 78
End: 2023-10-30

## 2023-10-30 RX ORDER — DEXLANSOPRAZOLE 60 MG/1
TAKE 1 CAPSULE ONCE A DAY CAPSULE, DELAYED RELEASE ORAL
Qty: 90 CAPSULE | Refills: 1 | OUTPATIENT

## 2024-03-28 ENCOUNTER — OV EP (OUTPATIENT)
Dept: URBAN - METROPOLITAN AREA CLINIC 54 | Facility: CLINIC | Age: 79
End: 2024-03-28
Payer: MEDICARE

## 2024-03-28 VITALS
TEMPERATURE: 98.4 F | DIASTOLIC BLOOD PRESSURE: 71 MMHG | BODY MASS INDEX: 35.3 KG/M2 | WEIGHT: 187 LBS | HEIGHT: 61 IN | SYSTOLIC BLOOD PRESSURE: 107 MMHG | HEART RATE: 71 BPM

## 2024-03-28 DIAGNOSIS — N85.02 EIN (ENDOMETRIAL INTRAEPITHELIAL NEOPLASIA): ICD-10-CM

## 2024-03-28 DIAGNOSIS — K63.5 COLON POLYPS: ICD-10-CM

## 2024-03-28 DIAGNOSIS — K56.609 SMALL BOWEL OBSTRUCTION: ICD-10-CM

## 2024-03-28 DIAGNOSIS — Z12.11 COLON CANCER SCREENING: ICD-10-CM

## 2024-03-28 DIAGNOSIS — Z87.19 HISTORY OF DIVERTICULITIS: ICD-10-CM

## 2024-03-28 PROCEDURE — 99213 OFFICE O/P EST LOW 20 MIN: CPT

## 2024-03-28 RX ORDER — OXYCODONE HYDROCHLORIDE AND ACETAMINOPHEN 5; 325 MG/1; MG/1
1 TABLET AS NEEDED TABLET ORAL
Status: ACTIVE | COMMUNITY

## 2024-03-28 RX ORDER — MULTIVIT WITH MINERALS/LUTEIN
AS DIRECTED TABLET ORAL
Status: ACTIVE | COMMUNITY

## 2024-03-28 RX ORDER — NEBIVOLOL HYDROCHLORIDE 5 MG/1
1 TABLET TABLET ORAL ONCE A DAY
Status: ACTIVE | COMMUNITY

## 2024-03-28 RX ORDER — GLUCOSAMINE/CHONDR SU A SOD 750-600 MG
2 CAPSULES TABLET ORAL ONCE A DAY
Status: ACTIVE | COMMUNITY

## 2024-03-28 RX ORDER — GABAPENTIN 300 MG/1
1 CAPSULE CAPSULE ORAL ONCE A DAY
Status: ACTIVE | COMMUNITY

## 2024-03-28 RX ORDER — LEVOTHYROXINE SODIUM 0.03 MG/1
1 TABLET IN THE MORNING ON AN EMPTY STOMACH TABLET ORAL ONCE A DAY
Status: ACTIVE | COMMUNITY

## 2024-03-28 RX ORDER — DEXLANSOPRAZOLE 60 MG/1
TAKE 1 CAPSULE ONCE A DAY CAPSULE, DELAYED RELEASE ORAL
Qty: 90 CAPSULE | Refills: 1 | Status: ACTIVE | COMMUNITY

## 2024-03-28 NOTE — HPI-TODAY'S VISIT:
This is a 73-year-old female who presents for follow-up.  Patient was admitted in February 2019 to [facility] in [place] after developing acute abdominal pain.  She was diagnosed with incarcerated hernia on imaging and underwent emergency exploratory laparotomy.  Unfortunately I do not have the operative report available to me at this time but she reports that she had significant intra-abdominal adhesion that required lysis of adhesion.  Postoperatively, she was readmitted with recurrent bowel obstruction which was treated conservatively with NG tube to low intermittent wall suction.  She was again hospitalized a week later after discharge due to worsening abdominal pain and nausea /vomiting.  She states that she underwent small-bowel follow-through which showed narrowed areas in the small bowel without obvious transition point.  She has longstanding history of chronic back pain and has been on narcotic analgesics for the past 5 years.  She has opioid induced constipation and has been using MiraLax.  She recently a try weaning herself off the narcotic analgesics and had withdrawal symptoms.  She was recently given trial with Relistor but has not yet taken the medication.  She underwent small-bowel follow-through on 05/08/2019 that revealed nonobstructive bowel gas patterns with enteric contrast reaching the colon by 1 hour and 50 min.  There was no evidence of partial small-bowel obstruction.  She states that she has stopped taking morphine and her abdominal pain and constipation is improved with MiraLax.  Follow Up 8/13/21: Patient presents for routine follow up. She is interested in a surveillance colonoscopy. Her last exam was ? 5 years ago with GAG and unknown findings. No close family history of  CRC. She is down to One percocet per day along with Miralax, stool softeners and stimulant laxatives along with fiber therapy. She has chronic back pain s/p surgical intervention in 2016. She also carries a diagnosis of GERD , HH and ? Armstrong's esophagus.  Follow Up 3/28/24: Pt presents for colon cancer screening consult, asymptomatic. GAG records reviewed - last colonoscopy was 6/27/2018 with a diminutive hyperplastic sigmoid polyp. No adenomas. Prior to that she had one in 2013 in [place] but results are not available. Does not think she has had precancerous polyps. Grandfather had CRC but no FDR. 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 Verna Valera. States she needs to know her hx of colon polyps and a colonoscopy to check for abd adhesions and new polyps. No mention of this in note or referral. Pt is seeing cards Dr. Remy Abdalla next week for stress test and echo.

## 2024-05-01 ENCOUNTER — TELEPHONE ENCOUNTER (OUTPATIENT)
Dept: URBAN - METROPOLITAN AREA CLINIC 54 | Facility: CLINIC | Age: 79
End: 2024-05-01

## 2024-05-01 RX ORDER — DEXLANSOPRAZOLE 60 MG/1
TAKE 1 CAPSULE ONCE A DAY CAPSULE, DELAYED RELEASE ORAL
Qty: 90 CAPSULE | Refills: 1

## 2024-05-04 ENCOUNTER — ERX REFILL RESPONSE (OUTPATIENT)
Dept: URBAN - METROPOLITAN AREA CLINIC 54 | Facility: CLINIC | Age: 79
End: 2024-05-04

## 2024-05-04 RX ORDER — DEXLANSOPRAZOLE 60 MG/1
TAKE 1 CAPSULE ONCE A DAY CAPSULE, DELAYED RELEASE ORAL
Qty: 90 CAPSULE | Refills: 2 | OUTPATIENT

## 2024-05-04 RX ORDER — DEXLANSOPRAZOLE 60 MG/1
TAKE 1 CAPSULE ONCE A DAY CAPSULE, DELAYED RELEASE ORAL
Qty: 90 CAPSULE | Refills: 1 | OUTPATIENT

## 2024-07-08 ENCOUNTER — OFFICE VISIT (OUTPATIENT)
Dept: URBAN - METROPOLITAN AREA CLINIC 54 | Facility: CLINIC | Age: 79
End: 2024-07-08

## 2024-07-22 ENCOUNTER — OFFICE VISIT (OUTPATIENT)
Dept: URBAN - METROPOLITAN AREA CLINIC 54 | Facility: CLINIC | Age: 79
End: 2024-07-22

## 2024-09-05 ENCOUNTER — ERX REFILL RESPONSE (OUTPATIENT)
Dept: URBAN - METROPOLITAN AREA CLINIC 54 | Facility: CLINIC | Age: 79
End: 2024-09-05

## 2024-09-05 RX ORDER — DEXLANSOPRAZOLE 60 MG/1
TAKE 1 CAPSULE ONCE A DAY CAPSULE, DELAYED RELEASE ORAL
Qty: 90 CAPSULE | Refills: 1 | OUTPATIENT

## 2024-11-11 ENCOUNTER — OFFICE VISIT (OUTPATIENT)
Dept: URBAN - METROPOLITAN AREA CLINIC 54 | Facility: CLINIC | Age: 79
End: 2024-11-11

## 2025-01-27 ENCOUNTER — OFFICE VISIT (OUTPATIENT)
Dept: URBAN - METROPOLITAN AREA CLINIC 54 | Facility: CLINIC | Age: 80
End: 2025-01-27